# Patient Record
Sex: FEMALE | Race: WHITE | Employment: OTHER | URBAN - METROPOLITAN AREA
[De-identification: names, ages, dates, MRNs, and addresses within clinical notes are randomized per-mention and may not be internally consistent; named-entity substitution may affect disease eponyms.]

---

## 2017-01-23 ENCOUNTER — APPOINTMENT (OUTPATIENT)
Dept: CT IMAGING | Age: 72
End: 2017-01-23
Attending: EMERGENCY MEDICINE
Payer: MEDICARE

## 2017-01-23 ENCOUNTER — APPOINTMENT (OUTPATIENT)
Dept: GENERAL RADIOLOGY | Age: 72
End: 2017-01-23
Attending: EMERGENCY MEDICINE
Payer: MEDICARE

## 2017-01-23 ENCOUNTER — HOSPITAL ENCOUNTER (EMERGENCY)
Age: 72
Discharge: HOME | End: 2017-01-23
Attending: EMERGENCY MEDICINE | Admitting: EMERGENCY MEDICINE
Payer: MEDICARE

## 2017-01-23 VITALS
OXYGEN SATURATION: 96 % | DIASTOLIC BLOOD PRESSURE: 78 MMHG | HEART RATE: 105 BPM | HEIGHT: 65 IN | RESPIRATION RATE: 16 BRPM | WEIGHT: 235 LBS | TEMPERATURE: 98 F | BODY MASS INDEX: 39.15 KG/M2 | SYSTOLIC BLOOD PRESSURE: 155 MMHG

## 2017-01-23 DIAGNOSIS — R07.9 CHEST PAIN, UNSPECIFIED TYPE: Primary | ICD-10-CM

## 2017-01-23 LAB
ALBUMIN SERPL BCP-MCNC: 3.2 G/DL (ref 3.5–5)
ALBUMIN/GLOB SERPL: 0.8 {RATIO} (ref 1.1–2.2)
ALP SERPL-CCNC: 69 U/L (ref 45–117)
ALT SERPL-CCNC: 79 U/L (ref 12–78)
ANION GAP BLD CALC-SCNC: 11 MMOL/L (ref 5–15)
APPEARANCE UR: CLEAR
AST SERPL W P-5'-P-CCNC: 27 U/L (ref 15–37)
ATRIAL RATE: 141 BPM
BACTERIA URNS QL MICRO: NEGATIVE /HPF
BASOPHILS # BLD AUTO: 0 K/UL (ref 0–0.1)
BASOPHILS # BLD: 0 % (ref 0–1)
BILIRUB SERPL-MCNC: 0.4 MG/DL (ref 0.2–1)
BILIRUB UR QL: NEGATIVE
BNP SERPL-MCNC: 254 PG/ML (ref 0–125)
BUN SERPL-MCNC: 16 MG/DL (ref 6–20)
BUN/CREAT SERPL: 19 (ref 12–20)
CALCIUM SERPL-MCNC: 9.5 MG/DL (ref 8.5–10.1)
CALCULATED P AXIS, ECG09: 38 DEGREES
CALCULATED R AXIS, ECG10: 4 DEGREES
CALCULATED T AXIS, ECG11: 54 DEGREES
CHLORIDE SERPL-SCNC: 103 MMOL/L (ref 97–108)
CK MB CFR SERPL CALC: NORMAL % (ref 0–2.5)
CK MB SERPL-MCNC: <1 NG/ML (ref 5–25)
CK SERPL-CCNC: 67 U/L (ref 26–192)
CO2 SERPL-SCNC: 24 MMOL/L (ref 21–32)
COLOR UR: ABNORMAL
CREAT SERPL-MCNC: 0.86 MG/DL (ref 0.55–1.02)
DIAGNOSIS, 93000: NORMAL
DIFFERENTIAL METHOD BLD: ABNORMAL
EOSINOPHIL # BLD: 0 K/UL (ref 0–0.4)
EOSINOPHIL NFR BLD: 0 % (ref 0–7)
EPITH CASTS URNS QL MICRO: ABNORMAL /LPF
ERYTHROCYTE [DISTWIDTH] IN BLOOD BY AUTOMATED COUNT: 16.4 % (ref 11.5–14.5)
GLOBULIN SER CALC-MCNC: 3.9 G/DL (ref 2–4)
GLUCOSE SERPL-MCNC: 143 MG/DL (ref 65–100)
GLUCOSE UR STRIP.AUTO-MCNC: NEGATIVE MG/DL
HCT VFR BLD AUTO: 43 % (ref 35–47)
HGB BLD-MCNC: 14.2 G/DL (ref 11.5–16)
HGB UR QL STRIP: ABNORMAL
HYALINE CASTS URNS QL MICRO: ABNORMAL /LPF (ref 0–5)
INR PPP: 1 (ref 0.9–1.1)
KETONES UR QL STRIP.AUTO: NEGATIVE MG/DL
LEUKOCYTE ESTERASE UR QL STRIP.AUTO: NEGATIVE
LIPASE SERPL-CCNC: 88 U/L (ref 73–393)
LYMPHOCYTES # BLD AUTO: 5 % (ref 12–49)
LYMPHOCYTES # BLD: 0.5 K/UL (ref 0.8–3.5)
MCH RBC QN AUTO: 30.5 PG (ref 26–34)
MCHC RBC AUTO-ENTMCNC: 33 G/DL (ref 30–36.5)
MCV RBC AUTO: 92.5 FL (ref 80–99)
MONOCYTES # BLD: 0.4 K/UL (ref 0–1)
MONOCYTES NFR BLD AUTO: 4 % (ref 5–13)
NEUTS SEG # BLD: 9.5 K/UL (ref 1.8–8)
NEUTS SEG NFR BLD AUTO: 91 % (ref 32–75)
NITRITE UR QL STRIP.AUTO: NEGATIVE
P-R INTERVAL, ECG05: 120 MS
PH UR STRIP: 6.5 [PH] (ref 5–8)
PLATELET # BLD AUTO: 199 K/UL (ref 150–400)
POTASSIUM SERPL-SCNC: 4.2 MMOL/L (ref 3.5–5.1)
PROT SERPL-MCNC: 7.1 G/DL (ref 6.4–8.2)
PROT UR STRIP-MCNC: NEGATIVE MG/DL
PROTHROMBIN TIME: 10 SEC (ref 9–11.1)
Q-T INTERVAL, ECG07: 288 MS
QRS DURATION, ECG06: 70 MS
QTC CALCULATION (BEZET), ECG08: 441 MS
RBC # BLD AUTO: 4.65 M/UL (ref 3.8–5.2)
RBC #/AREA URNS HPF: ABNORMAL /HPF (ref 0–5)
RBC MORPH BLD: ABNORMAL
SODIUM SERPL-SCNC: 138 MMOL/L (ref 136–145)
SP GR UR REFRACTOMETRY: 1.01 (ref 1–1.03)
TROPONIN I SERPL-MCNC: <0.04 NG/ML
UROBILINOGEN UR QL STRIP.AUTO: 0.2 EU/DL (ref 0.2–1)
VENTRICULAR RATE, ECG03: 141 BPM
WBC # BLD AUTO: 10.4 K/UL (ref 3.6–11)
WBC URNS QL MICRO: ABNORMAL /HPF (ref 0–4)

## 2017-01-23 PROCEDURE — 99285 EMERGENCY DEPT VISIT HI MDM: CPT

## 2017-01-23 PROCEDURE — 85025 COMPLETE CBC W/AUTO DIFF WBC: CPT | Performed by: EMERGENCY MEDICINE

## 2017-01-23 PROCEDURE — 74011636320 HC RX REV CODE- 636/320: Performed by: RADIOLOGY

## 2017-01-23 PROCEDURE — 81001 URINALYSIS AUTO W/SCOPE: CPT | Performed by: EMERGENCY MEDICINE

## 2017-01-23 PROCEDURE — 74011250636 HC RX REV CODE- 250/636: Performed by: EMERGENCY MEDICINE

## 2017-01-23 PROCEDURE — 85610 PROTHROMBIN TIME: CPT | Performed by: EMERGENCY MEDICINE

## 2017-01-23 PROCEDURE — 36415 COLL VENOUS BLD VENIPUNCTURE: CPT | Performed by: EMERGENCY MEDICINE

## 2017-01-23 PROCEDURE — 80053 COMPREHEN METABOLIC PANEL: CPT | Performed by: EMERGENCY MEDICINE

## 2017-01-23 PROCEDURE — 82550 ASSAY OF CK (CPK): CPT | Performed by: EMERGENCY MEDICINE

## 2017-01-23 PROCEDURE — 93005 ELECTROCARDIOGRAM TRACING: CPT

## 2017-01-23 PROCEDURE — 96361 HYDRATE IV INFUSION ADD-ON: CPT

## 2017-01-23 PROCEDURE — 71275 CT ANGIOGRAPHY CHEST: CPT

## 2017-01-23 PROCEDURE — 96360 HYDRATION IV INFUSION INIT: CPT

## 2017-01-23 PROCEDURE — 71020 XR CHEST PA LAT: CPT

## 2017-01-23 PROCEDURE — 83880 ASSAY OF NATRIURETIC PEPTIDE: CPT | Performed by: EMERGENCY MEDICINE

## 2017-01-23 PROCEDURE — 84484 ASSAY OF TROPONIN QUANT: CPT | Performed by: EMERGENCY MEDICINE

## 2017-01-23 PROCEDURE — 83690 ASSAY OF LIPASE: CPT | Performed by: EMERGENCY MEDICINE

## 2017-01-23 RX ADMIN — SODIUM CHLORIDE 1000 ML: 900 INJECTION, SOLUTION INTRAVENOUS at 14:56

## 2017-01-23 RX ADMIN — SODIUM CHLORIDE 1000 ML: 900 INJECTION, SOLUTION INTRAVENOUS at 16:55

## 2017-01-23 RX ADMIN — IOPAMIDOL 80 ML: 755 INJECTION, SOLUTION INTRAVENOUS at 16:43

## 2017-01-23 NOTE — ED NOTES
Pt discharged by provider. Pt ambulatory off the unit with steady gait with her spouse and in NAD. Pt declined using a w/c.

## 2017-01-23 NOTE — DISCHARGE INSTRUCTIONS
Chest Pain: Care Instructions  Your Care Instructions  There are many things that can cause chest pain. Some are not serious and will get better on their own in a few days. But some kinds of chest pain need more testing and treatment. Your doctor may have recommended a follow-up visit in the next 8 to 12 hours. If you are not getting better, you may need more tests or treatment. Even though your doctor has released you, you still need to watch for any problems. The doctor carefully checked you, but sometimes problems can develop later. If you have new symptoms or if your symptoms do not get better, get medical care right away. If you have worse or different chest pain or pressure that lasts more than 5 minutes or you passed out (lost consciousness), call 911 or seek other emergency help right away. A medical visit is only one step in your treatment. Even if you feel better, you still need to do what your doctor recommends, such as going to all suggested follow-up appointments and taking medicines exactly as directed. This will help you recover and help prevent future problems. How can you care for yourself at home? · Rest until you feel better. · Take your medicine exactly as prescribed. Call your doctor if you think you are having a problem with your medicine. · Do not drive after taking a prescription pain medicine. When should you call for help? Call 911 if:  · You passed out (lost consciousness). · You have severe difficulty breathing. · You have symptoms of a heart attack. These may include:  ¨ Chest pain or pressure, or a strange feeling in your chest.  ¨ Sweating. ¨ Shortness of breath. ¨ Nausea or vomiting. ¨ Pain, pressure, or a strange feeling in your back, neck, jaw, or upper belly or in one or both shoulders or arms. ¨ Lightheadedness or sudden weakness. ¨ A fast or irregular heartbeat.   After you call 911, the  may tell you to chew 1 adult-strength or 2 to 4 low-dose aspirin. Wait for an ambulance. Do not try to drive yourself. Call your doctor today if:  · You have any trouble breathing. · Your chest pain gets worse. · You are dizzy or lightheaded, or you feel like you may faint. · You are not getting better as expected. · You are having new or different chest pain. Where can you learn more? Go to http://demarco-cathy.info/. Enter A120 in the search box to learn more about \"Chest Pain: Care Instructions. \"  Current as of: May 27, 2016  Content Version: 11.1  © 6341-6100 [employer]. Care instructions adapted under license by [employer] (which disclaims liability or warranty for this information). If you have questions about a medical condition or this instruction, always ask your healthcare professional. [address] any warranty or liability for your use of this information. We hope that we have addressed all of your medical concerns. The examination and treatment you received in the Emergency Department were for an emergent problem and were not intended as complete care. It is important that you follow up with your healthcare provider(s) for ongoing care. If your symptoms worsen or do not improve as expected, and you are unable to reach your usual health care provider(s), you should return to the Emergency Department. Today's healthcare is undergoing tremendous change, and patient satisfaction surveys are one of the many tools to assess the quality of medical care. You may receive a survey from the [employer] organization regarding your experience in the Emergency Department. I hope that your experience has been completely positive, particularly the medical care that I provided. As such, please participate in the survey; anything less than excellent does not meet my expectations or intentions.         [facility] and [facility] participate in nationally recognized quality of care measures. If your blood pressure is greater than 120/80, as reported below, we urge that you seek medical care to address the potential of high blood pressure, commonly known as hypertension. Hypertension can be hereditary or can be caused by certain medical conditions, pain, stress, or \"white coat syndrome. \"       Please make an appointment with your health care provider(s) for follow up of your Emergency Department visit. VITALS:   Patient Vitals for the past 8 hrs:   Temp Pulse Resp BP SpO2   01/23/17 1615 - (!) 112 16 149/66 95 %   01/23/17 1600 - (!) 117 22 143/69 92 %   01/23/17 1545 - (!) 119 21 144/70 93 %   01/23/17 1515 - (!) 130 20 126/67 94 %   01/23/17 1500 - (!) 137 28 158/62 93 %   01/23/17 1442 98 °F (36.7 °C) (!) 135 17 (!) 150/91 94 %          Thank you for allowing us to provide you with medical care today. We realize that you have many choices for your emergency care needs. Please choose us in the future for any continued health care needs. [address], [address] [facility].   Office: [phone]            Recent Results (from the past 24 hour(s))   EKG, 12 LEAD, INITIAL    Collection Time: 01/23/17  2:40 PM   Result Value Ref Range    Ventricular Rate 141 BPM    Atrial Rate 141 BPM    P-R Interval 120 ms    QRS Duration 70 ms    Q-T Interval 288 ms    QTC Calculation (Bezet) 441 ms    Calculated P Axis 38 degrees    Calculated R Axis 4 degrees    Calculated T Axis 54 degrees    Diagnosis       Sinus tachycardia  Otherwise normal ECG  When compared with ECG of 04-OCT-2016 15:12,  No significant change was found  Confirmed by Richi Traore ([id number]) on 1/23/2017 5:31:41 PM     PROTHROMBIN TIME + INR    Collection Time: 01/23/17  2:54 PM   Result Value Ref Range    INR 1.0 0.9 - 1.1      Prothrombin time 10.0 9.0 - 11.1 sec   TROPONIN I    Collection Time: 01/23/17  2:54 PM   Result Value Ref Range Troponin-I, Qt. <0.04 <0.05 ng/mL   LIPASE    Collection Time: 01/23/17  2:54 PM   Result Value Ref Range    Lipase 88 73 - 745 U/L   METABOLIC PANEL, COMPREHENSIVE    Collection Time: 01/23/17  2:54 PM   Result Value Ref Range    Sodium 138 136 - 145 mmol/L    Potassium 4.2 3.5 - 5.1 mmol/L    Chloride 103 97 - 108 mmol/L    CO2 24 21 - 32 mmol/L    Anion gap 11 5 - 15 mmol/L    Glucose 143 (H) 65 - 100 mg/dL    BUN 16 6 - 20 MG/DL    Creatinine 0.86 0.55 - 1.02 MG/DL    BUN/Creatinine ratio 19 12 - 20      GFR est AA >60 >60 ml/min/1.73m2    GFR est non-AA >60 >60 ml/min/1.73m2    Calcium 9.5 8.5 - 10.1 MG/DL    Bilirubin, total 0.4 0.2 - 1.0 MG/DL    ALT 79 (H) 12 - 78 U/L    AST 27 15 - 37 U/L    Alk. phosphatase 69 45 - 117 U/L    Protein, total 7.1 6.4 - 8.2 g/dL    Albumin 3.2 (L) 3.5 - 5.0 g/dL    Globulin 3.9 2.0 - 4.0 g/dL    A-G Ratio 0.8 (L) 1.1 - 2.2     CK W/ CKMB & INDEX    Collection Time: 01/23/17  2:54 PM   Result Value Ref Range    CK 67 26 - 192 U/L    CK - MB <1.0 <3.6 NG/ML    CK-MB Index CANNOT BE CALCULATED 0 - 2.5     CBC WITH AUTOMATED DIFF    Collection Time: 01/23/17  2:54 PM   Result Value Ref Range    WBC 10.4 3.6 - 11.0 K/uL    RBC 4.65 3.80 - 5.20 M/uL    HGB 14.2 11.5 - 16.0 g/dL    HCT 43.0 35.0 - 47.0 %    MCV 92.5 80.0 - 99.0 FL    MCH 30.5 26.0 - 34.0 PG    MCHC 33.0 30.0 - 36.5 g/dL    RDW 16.4 (H) 11.5 - 14.5 %    PLATELET 203 257 - 224 K/uL    NEUTROPHILS 91 (H) 32 - 75 %    LYMPHOCYTES 5 (L) 12 - 49 %    MONOCYTES 4 (L) 5 - 13 %    EOSINOPHILS 0 0 - 7 %    BASOPHILS 0 0 - 1 %    ABS. NEUTROPHILS 9.5 (H) 1.8 - 8.0 K/UL    ABS. LYMPHOCYTES 0.5 (L) 0.8 - 3.5 K/UL    ABS. MONOCYTES 0.4 0.0 - 1.0 K/UL    ABS. EOSINOPHILS 0.0 0.0 - 0.4 K/UL    ABS.  BASOPHILS 0.0 0.0 - 0.1 K/UL    DF SMEAR SCANNED      RBC COMMENTS ANISOCYTOSIS  1+       PRO-BNP    Collection Time: 01/23/17  2:54 PM   Result Value Ref Range    NT pro- (H) 0 - 125 PG/ML   URINALYSIS W/MICROSCOPIC Collection Time: 01/23/17  4:25 PM   Result Value Ref Range    Color YELLOW/STRAW      Appearance CLEAR CLEAR      Specific gravity 1.007 1.003 - 1.030      pH (UA) 6.5 5.0 - 8.0      Protein NEGATIVE  NEG mg/dL    Glucose NEGATIVE  NEG mg/dL    Ketone NEGATIVE  NEG mg/dL    Bilirubin NEGATIVE  NEG      Blood TRACE (A) NEG      Urobilinogen 0.2 0.2 - 1.0 EU/dL    Nitrites NEGATIVE  NEG      Leukocyte Esterase NEGATIVE  NEG      WBC 0-4 0 - 4 /hpf    RBC 0-5 0 - 5 /hpf    Epithelial cells FEW FEW /lpf    Bacteria NEGATIVE  NEG /hpf    Hyaline Cast 0-2 0 - 5 /lpf       Xr Chest Pa Lat    Result Date: 1/23/2017  Exam:  2 view chest Indication: Chest pain, tachycardia today Comparison to 10/4/2016. PA and lateral views demonstrate normal heart size. There is no acute process in the lung fields. Scarring is stable at the lung bases. Old bilateral rib fractures are noted. Impression: No acute process or change compared to the prior exam.     Cta Chest W Wo Cont    Result Date: 1/23/2017  EXAM: CT of the chest, abdomen and pelvis. Clinical history: Metastatic breast cancer, chest pain INDICATION: Follow-up pneumonia and restaging of metastatic breast cancer. Chest pain acute, pulmonary origin. TECHNIQUE: CT of the chest, with 5 mm collimation. The study is performed with p.o. and IV contrast. Sagittal and coronal reformats were performed and evaluated. CT dose reduction was achieved through use of a standardized protocol tailored for this examination and automatic exposure control for dose modulation. Adaptive statistical iterative reconstruction (ASIR) was utilized. 3-D MIP reconstructed imaging was performed CONTRAST:  100 cc Isovue-370 contrast used IV. COMPARISON: 11/3/2016 FINDINGS: The lungs show interval clearing of bibasilar airspace infiltrate with some residual linear atelectasis and/or scarring medially. The lungs are otherwise clear with no acute infiltrate or nodular density otherwise.  There is no enlarged axillary, mediastinal or hilar lymphadenopathy. Pleural spaces are normal. Heart is of normal size and there is no pericardial effusion. The surrounding musculoskeletal structures redemonstrate post surgical changes of left breast surgery and left axillary node dissection as well as expansile mixed lytic and blastic lesions within the left posterior lateral seventh eighth and ninth ribs on the right posterior seventh rib, multiple vertebral lesions, not significantly changed . Healing right posterior rib fracture. Cholelithiasis. Chronic vertebral lesions at T6-T7 and T11. IMPRESSION: No pulmonary embolism. No aortic aneurysm or dissection. 1. Healing right-sided rib fracture. 2. Numerous osseous metastases with no new sites of metastasis identified within the chest, abdomen, or pelvis, shown showing increased sclerosis from prior which may reflect changes in response to therapy. 3. Additional incidental findings as above including cholelithiasis and sliding hiatal hernia.

## 2017-01-23 NOTE — ED PROVIDER NOTES
HPI Comments: 80 y.o. female with past medical history significant for breast cancer with metastases to bone who presents to the ED with chief complaint of chest pain. Pt reports \"crampy\" mid-sternal chest pain onset this morning at approximately 1130 accompanied by diaphoresis and lightheadedness. Pt states the episode lasted for about 30 minutes and her sx are now resolved. Pt states she has exertional SOB at baseline but is not currently short of breath. Pt states she has hx of similar sx, most recently last week. Pt states she is currently being treated for breast cancer with metastases to her bones by Dr. Ines Ortiz and took a new oral chemotherapy medication this morning about 2 hours prior to the onset of her chest pain, says she is supposed to take another dose of the medication tonight. Pt states she is also currently taking prednisone which has been causing her to have issues with acid reflux. Pt states she last ate eggs and a sausage medina at about 0930 this morning. Pt denies taking any medications for her sx. Pt states she has hx of gall bladder issues. Pt denies diarrhea, fever, chills, abdominal pain, vaginal discharge, or dysuria. There are no other acute medical complaints voiced at this time. Social Hx: . PCP: Joyce Ponce MD  Oncology: Dr. Ines Ortiz    Note written by Lakisha Cortez, as dictated by Yanna Matson MD 2:50 PM     The history is provided by the patient.         Past Medical History:   Diagnosis Date    Cancer [facility]) 2011     LEFT breast cancer    Ill-defined condition      Breast CA mets bone 2/2016       Past Surgical History:   Procedure Laterality Date    Hx appendectomy      Hx breast lumpectomy       LEFT breast    Hx other surgical  3/2011     portacath insertion    Colonoscopy N/A 9/15/2016     COLONOSCOPY performed by Mikaela Rocha MD at [facility]         Family History:   Problem Relation Age of Onset    Cancer Mother      colon    Heart Disease Father     Cancer Father     Diabetes Sister        Social History     Social History    Marital status:      Spouse name: N/A    Number of children: N/A    Years of education: N/A     Occupational History    Not on file. Social History Main Topics    Smoking status: Never Smoker    Smokeless tobacco: Never Used    Alcohol use No    Drug use: No    Sexual activity: Yes     Partners: Male     Other Topics Concern    Not on file     Social History Narrative         ALLERGIES: Afinitor [everolimus]    Review of Systems   Constitutional: Positive for diaphoresis. Negative for chills and fever. Cardiovascular: Positive for chest pain. Gastrointestinal: Negative for abdominal pain and diarrhea. Genitourinary: Negative for dysuria and vaginal discharge. Neurological: Positive for light-headedness. All other systems reviewed and are negative. Vitals:    01/23/17 1600 01/23/17 1615 01/23/17 1734 01/23/17 1812   BP: 143/69 149/66 139/54 155/78   Pulse: (!) 117 (!) 112 (!) 106 (!) 105   Resp: 22 16 12 16   Temp:       SpO2: 92% 95%  96%   Weight:       Height:                Physical Exam   Constitutional: She is oriented to person, place, and time. She appears well-developed and well-nourished. No distress. NAD, AxOx4, speaking in complete sentences     HENT:   Nose: Nose normal.   Mouth/Throat: No oropharyngeal exudate. Cn intact   Eyes: Conjunctivae and EOM are normal. Pupils are equal, round, and reactive to light. Right eye exhibits no discharge. Left eye exhibits no discharge. No scleral icterus. Neck: Normal range of motion. Neck supple. No JVD present. No tracheal deviation present. Cardiovascular: Normal rate, regular rhythm, normal heart sounds and intact distal pulses. Exam reveals no gallop and no friction rub. No murmur heard. Pulmonary/Chest: Effort normal and breath sounds normal. No respiratory distress. She has no wheezes. She has no rales.  She exhibits no tenderness. Abdominal: Soft. Bowel sounds are normal. There is no tenderness. There is no rebound and no guarding. nttp   Genitourinary: No vaginal discharge found. Musculoskeletal: Normal range of motion. She exhibits no edema or tenderness. Neurological: She is alert and oriented to person, place, and time. No cranial nerve deficit. She exhibits normal muscle tone. Coordination normal.   Skin: Skin is warm and dry. No rash noted. No erythema. No pallor. Psychiatric: She has a normal mood and affect. Her behavior is normal. Thought content normal.   Nursing note and vitals reviewed. MDM  ED Course       Procedures    Chief Complaint   Patient presents with    Chest Pain       2:40 PM  The patients presenting problems have been discussed, and they are in agreement with the care plan formulated and outlined with them. I have encouraged them to ask questions as they arise throughout their visit. MEDICATIONS GIVEN:  Medications - No data to display    LABS REVIEWED:  Labs Reviewed - No data to display    RADIOLOGY RESULTS:  The following have been ordered and reviewed:  _____________________________________________________________________  _____________________________________________________________________    EKG interpretation: (Preliminary)  Rhythm: sinus tachycardia rhythm; and regular . Rate (approx.): 140; Axis: normal; P wave: normal; QRS interval: normal ; ST/T wave: normal; Negative acute significant segmental elevations    PROCEDURES:        CONSULTATIONS:       PROGRESS NOTES:      DIAGNOSIS:    1. Chest pain, unspecified type        PLAN:  1- Chest Pain / neg ed evaluation - will have pt follow-up with Cardiology;       ED COURSE: The patients hospital course has been uncomplicated. 4:27 PM  Pt told of results thus far; agrees w/ CT-PE;     5:35 PM  Gayle Arita's  results have been reviewed with her. She has been counseled regarding her diagnosis.   She verbally conveys understanding and agreement of the signs, symptoms, diagnosis, treatment and prognosis and additionally agrees to Call/ Arrange follow up as recommended with Dr. Tana Ag MD in 24 - 48 hours. She also agrees with the care-plan and conveys that all of her questions have been answered. I have also put together some discharge instructions for her that include: 1) educational information regarding their diagnosis, 2) how to care for their diagnosis at home, as well a 3) list of reasons why they would want to return to the ED prior to their follow-up appointment, should their condition change or for concerns.

## 2017-01-23 NOTE — ED TRIAGE NOTES
Pt. Reports an episode of chest pain and diaphoresis that began at 1000 and lasted approx. 20 minutes. Denies any since. States she began a new cancer dug this morning and is worried it may be that.

## 2017-01-25 ENCOUNTER — OFFICE VISIT (OUTPATIENT)
Dept: INTERNAL MEDICINE CLINIC | Age: 72
End: 2017-01-25

## 2017-01-25 VITALS
OXYGEN SATURATION: 98 % | SYSTOLIC BLOOD PRESSURE: 123 MMHG | TEMPERATURE: 98.2 F | HEIGHT: 65 IN | BODY MASS INDEX: 39.52 KG/M2 | WEIGHT: 237.2 LBS | RESPIRATION RATE: 16 BRPM | DIASTOLIC BLOOD PRESSURE: 74 MMHG | HEART RATE: 137 BPM

## 2017-01-25 DIAGNOSIS — K21.9 GASTROESOPHAGEAL REFLUX DISEASE WITHOUT ESOPHAGITIS: ICD-10-CM

## 2017-01-25 DIAGNOSIS — R07.89 OTHER CHEST PAIN: Primary | ICD-10-CM

## 2017-01-25 DIAGNOSIS — C50.919 BREAST CANCER, STAGE 3, UNSPECIFIED LATERALITY (HCC): ICD-10-CM

## 2017-01-25 DIAGNOSIS — R06.02 SOB (SHORTNESS OF BREATH): ICD-10-CM

## 2017-01-25 RX ORDER — FLUTICASONE PROPIONATE AND SALMETEROL XINAFOATE 115; 21 UG/1; UG/1
AEROSOL, METERED RESPIRATORY (INHALATION)
COMMUNITY
Start: 2017-01-17 | End: 2017-02-28

## 2017-01-25 RX ORDER — OMEPRAZOLE 40 MG/1
CAPSULE, DELAYED RELEASE ORAL DAILY
COMMUNITY
Start: 2016-12-13 | End: 2019-01-11 | Stop reason: ALTCHOICE

## 2017-01-25 RX ORDER — CAPECITABINE 500 MG/1
TABLET, FILM COATED ORAL
Refills: 6 | COMMUNITY
Start: 2017-01-11 | End: 2017-12-18 | Stop reason: ALTCHOICE

## 2017-01-31 ENCOUNTER — OFFICE VISIT (OUTPATIENT)
Dept: CARDIOLOGY CLINIC | Age: 72
End: 2017-01-31

## 2017-01-31 VITALS
BODY MASS INDEX: 40.15 KG/M2 | RESPIRATION RATE: 22 BRPM | SYSTOLIC BLOOD PRESSURE: 136 MMHG | HEART RATE: 102 BPM | OXYGEN SATURATION: 96 % | DIASTOLIC BLOOD PRESSURE: 84 MMHG | HEIGHT: 65 IN | WEIGHT: 241 LBS

## 2017-01-31 DIAGNOSIS — R00.0 SINUS TACHYCARDIA: ICD-10-CM

## 2017-01-31 DIAGNOSIS — C50.919 BREAST CANCER, STAGE 3, UNSPECIFIED LATERALITY (HCC): ICD-10-CM

## 2017-01-31 DIAGNOSIS — R06.00 DYSPNEA, UNSPECIFIED TYPE: Primary | ICD-10-CM

## 2017-01-31 NOTE — PROGRESS NOTES
Magdaleno Arceo MD    [id number] [place], [address] [facility]    Office [phone],BL [phone]  Pager [phone]    Selena Jones is a 80 y.o. female referred for evaluation of dyspnea. Consult requested by Saud Simms MD      Primary care physician:  Saud Simms MD      Dear Dr Zak Jo,    I had the pleasure of seeing Ms Selena Jones in the office today. Chief complaint:  Selena Jones is a 80 y.o. female who complains of   Chief Complaint   Patient presents with    Irregular Heart Beat     F/u from Er  c/o SOB  ongoing    Chest Pain       Assessment:  Dyspnea  Chest Pain-atypical.  Left breast cancer metastasized to bone-recent chemotherapy caused pneumonitis for which he is on a weaning dose of steroids. (History of radiation treatment to the chest)  GERD-improved      Plan:   Echocardiogram.  She will follow-up with me in 4 weeks and if she continues to have symptoms of atypical chest pain, we will consider doing a stress nuclear study. She states that her lipids have been within normal range. Aggressive cardiovascular risk factor modification. Patient understands the plan. All questions were answered to the patient's satisfaction. Medication Side Effects and Warnings were discussed with patient: yes  Patient Labs were reviewed and or requested:  yes  Patient Past Records were reviewed and or requested: yes    I appreciate the opportunity to be involved in Ms. Love. Please see note below for details. Please do not hesitate to contact us with questions or concerns. Magdaleno Arceo MD    Cardiac Testing/ Procedures: A. Cardiac Cath/PCI:    B.ECHO/ZAID: 3/30/11 - Left ventricle: Systolic function was normal. Ejection fraction was  estimated in the range of 55 % to 60 %. There were no regional wall motion  abnormalities.     C.StressNuclear/Stress ECHO/Stress test:    D.Vascular:    E. EP:    F. Miscellaneous:    History of present illness:     Patient is a pleasant 52-year-old  female who has a history of breast cancer status post left lumpectomy with radiation treatment to the chest twice. She has been on chemotherapy. She states that her breast cancer has metastasized to the bone-spine/hips/sternum/ribs. She was on a medication for her cancer recently which caused her to have pneumonitis. She is off that medication. She has seen  (pulmonologist) who has treated the pneumonitis with steroids. She developed GERD because of the steroids and is now on a PPI. On 1/23/17 she came to the emergency room because she had an episode of diaphoresis, epigastric pain, dizziness when she stood up. CT scan was negative for PE. Troponin was negative. EKG showed sinus tachycardia. Patient states that her GERD symptoms have resolved and she does not have any chest pain. Denies any swelling of the lower extremities. No prior cardiac history. No chest pain.       Past Medical History   Diagnosis Date    Cancer [facility]) 2011     LEFT breast cancer    Ill-defined condition      Breast CA mets bone 2/2016      Past Surgical History   Procedure Laterality Date    Hx appendectomy      Hx breast lumpectomy       LEFT breast    Hx other surgical  3/2011     portacath insertion    Colonoscopy N/A 9/15/2016     COLONOSCOPY performed by Willow Olsen MD at [facility]      Family History   Problem Relation Age of Onset    Cancer Mother      colon    Heart Disease Father     Cancer Father     Diabetes Sister       Social History   Substance Use Topics    Smoking status: Never Smoker    Smokeless tobacco: Never Used    Alcohol use No          Medications before admission:    Current Outpatient Prescriptions   Medication Sig Dispense    capecitabine (XELODA) 500 mg tablet TK 4 TABLETS PO BID FOR 14 DAYS     ADVAIR -21 mcg/actuation inhaler      omeprazole (PRILOSEC) 40 mg capsule      predniSONE (DELTASONE) 20 mg tablet Take 2 Tabs by mouth daily (with breakfast). 60 Tab    denosumab (XGEVA) soln injection 120 mg by SubCUTAneous route every thirty (30) days.  pyridoxine (VITAMIN B-6) 100 mg tablet Take 100 mg by mouth daily.  calcium-vitamin D (OYSTER SHELL) 500 mg(1,250mg) -200 unit per tablet Take 1 Tab by mouth daily.  exemestane (AROMASIN) 25 mg tablet Take 25 mg by mouth daily. No current facility-administered medications for this visit. Review of Systems:  (bold if positive, if negative)  As in HPI-rest of ROS noncontributory        Physical Exam:  Visit Vitals    /84 (BP 1 Location: Left arm, BP Patient Position: Sitting)    Pulse (!) 102    Resp 22    Ht 5' 5\" (1.651 m)    Wt 241 lb (109.3 kg)    LMP 01/28/2000    SpO2 96%    BMI 40.1 kg/m2          Gen: Well-developed, well-nourished, in no acute distress,   HEENT:  Pink conjunctivae, hearing intact to voice, moist mucous membranes  Neck: Supple,No JVD, No Carotid Bruit, Thyroid- non tender  Resp: No accessory muscle use, Clear breath sounds, No rales or rhonchi  Card: Regular Rate,Rythm,Normal S1, S2, No murmurs, rubs or gallop. No thrills.    Abd:  Soft, non-tender, non-distended, normoactive bowel sounds are present,   MSK: No cyanosis or clubbing  Skin: No rashes or ulcers  Neuro:  moving all four extremities, no focal deficit, follows commands appropriately  Psych:  Good insight, oriented to person, place and time, alert, Nml Affect  LE: No edema  Vascular: Radial Pulses 2+ and symmetric        EKG:   Results for orders placed or performed during the hospital encounter of 01/23/17   EKG, 12 LEAD, INITIAL   Result Value Ref Range    Ventricular Rate 141 BPM    Atrial Rate 141 BPM    P-R Interval 120 ms    QRS Duration 70 ms    Q-T Interval 288 ms    QTC Calculation (Bezet) 441 ms    Calculated P Axis 38 degrees    Calculated R Axis 4 degrees    Calculated T Axis 54 degrees    Diagnosis       Sinus tachycardia  Otherwise normal ECG  When compared with ECG of 04-OCT-2016 15:12,  No significant change was found  Confirmed by Tai Shah ([id number]) on 1/23/2017 5:31:41 PM         Cxray:    LABS:    No results for input(s): CPK, TROIQ in the last 72 hours.     No lab exists for component: CKQMB, CPKMB    Lab Results   Component Value Date/Time    WBC 10.4 01/23/2017 02:54 PM    HGB 14.2 01/23/2017 02:54 PM    HCT 43.0 01/23/2017 02:54 PM    PLATELET 426 80/81/2589 02:54 PM    MCV 92.5 01/23/2017 02:54 PM     Lab Results   Component Value Date/Time    Sodium 138 01/23/2017 02:54 PM    Potassium 4.2 01/23/2017 02:54 PM    Chloride 103 01/23/2017 02:54 PM    CO2 24 01/23/2017 02:54 PM    Anion gap 11 01/23/2017 02:54 PM    Glucose 143 01/23/2017 02:54 PM    BUN 16 01/23/2017 02:54 PM    Creatinine 0.86 01/23/2017 02:54 PM    BUN/Creatinine ratio 19 01/23/2017 02:54 PM    GFR est AA >60 01/23/2017 02:54 PM    GFR est non-AA >60 01/23/2017 02:54 PM    Calcium 9.5 01/23/2017 02:54 PM             Antoinette Keith MD

## 2017-01-31 NOTE — MR AVS SNAPSHOT
Visit Information Date & Time Provider Department Dept. Phone Encounter #  
 1/31/2017  9:00 AM Nick Liu MD [facility] Tamia Morgan [phone] [id number] Upcoming Health Maintenance Date Due DTaP/Tdap/Td series (1 - Tdap) 12/16/1967 ZOSTER VACCINE AGE 60> 12/16/2006 GLAUCOMA SCREENING Q2Y 5/1/2016 MEDICARE YEARLY EXAM 10/6/2016 BREAST CANCER SCRN MAMMOGRAM 2/1/2018 COLONOSCOPY 9/15/2026 Allergies as of 1/31/2017  Review Complete On: 1/31/2017 By: Sharon Akins LPN Severity Noted Reaction Type Reactions Afinitor [Everolimus]  01/23/2017    Other (comments) \"inflammation in my lungs\" Current Immunizations  Reviewed on 10/6/2015 Name Date Influenza High Dose Vaccine PF 10/24/2016 Influenza Vaccine 11/24/2014 Influenza Vaccine Split 10/13/2011 Pneumococcal Conjugate (PCV-13) 10/6/2015 Pneumococcal Vaccine (Unspecified Type) 10/1/2013 Not reviewed this visit You Were Diagnosed With   
  
 Codes Comments Dyspnea, unspecified type    -  Primary ICD-10-CM: R06.00 
ICD-9-CM: 786.09 Breast cancer, stage 3, unspecified laterality ([email or web address]ca [facility].)     ICD-10-CM: C50.919 ICD-9-CM: 174.9 Sinus tachycardia     ICD-10-CM: R00.0 ICD-9-CM: 427.89 Vitals BP Pulse Resp Height(growth percentile) Weight(growth percentile) LMP  
 136/84 (BP 1 Location: Left arm, BP Patient Position: Sitting) (!) 102 22 5' 5\" (1.651 m) 241 lb (109.3 kg) 01/28/2000 SpO2 BMI OB Status Smoking Status 96% 40.1 kg/m2 Postmenopausal Never Smoker Vitals History BMI and BSA Data Body Mass Index Body Surface Area  
 40.1 kg/m 2 2.24 m 2 Preferred Pharmacy Pharmacy Name Phone [id number] [place] [address] [phone] Your Updated Medication List  
  
   
This list is accurate as of: 1/31/17  9:43 AM.  Always use your most recent med list.  
  
  
  
  
 ADVAIR -21 mcg/actuation inhaler Generic drug:  fluticasone-salmeterol  
  
 calcium-vitamin D 500 mg(1,250mg) -200 unit per tablet Commonly known as:  OYSTER SHELL Take 1 Tab by mouth daily. capecitabine 500 mg tablet Commonly known as:  XELODA TK 4 TABLETS PO BID FOR 14 DAYS  
  
 denosumab Soln injection Commonly known as:  XGEVA  
120 mg by SubCUTAneous route every thirty (30) days. exemestane 25 mg tablet Commonly known as:  Francisco Javier Cake Take 25 mg by mouth daily. omeprazole 40 mg capsule Commonly known as:  PRILOSEC  
  
 predniSONE 20 mg tablet Commonly known as:  Yany Aver Take 2 Tabs by mouth daily (with breakfast). VITAMIN B-6 100 mg tablet Generic drug:  pyridoxine (vitamin B6) Take 100 mg by mouth daily. Introducing [facility] & HEALTH SERVICES! Dear Romi Palm: Thank you for requesting a Quitbit account. Our records indicate that you already have an active Quitbit account. You can access your account anytime at [email or web address] Did you know that you can access your hospital and ER discharge instructions at any time in Quitbit? You can also review all of your test results from your hospital stay or ER visit. Additional Information If you have questions, please visit the Frequently Asked Questions section of the Quitbit website at [email or web address] Remember, Quitbit is NOT to be used for urgent needs. For medical emergencies, dial 911. Now available from your iPhone and Android! Please provide this summary of care documentation to your next provider. Your primary care clinician is listed as Phill Jones. If you have any questions after today's visit, please call [phone].

## 2017-02-06 ENCOUNTER — CLINICAL SUPPORT (OUTPATIENT)
Dept: CARDIOLOGY CLINIC | Age: 72
End: 2017-02-06

## 2017-02-06 DIAGNOSIS — R06.00 DYSPNEA, UNSPECIFIED TYPE: Primary | ICD-10-CM

## 2017-02-28 ENCOUNTER — OFFICE VISIT (OUTPATIENT)
Dept: CARDIOLOGY CLINIC | Age: 72
End: 2017-02-28

## 2017-02-28 VITALS
SYSTOLIC BLOOD PRESSURE: 132 MMHG | WEIGHT: 243 LBS | OXYGEN SATURATION: 97 % | DIASTOLIC BLOOD PRESSURE: 78 MMHG | BODY MASS INDEX: 40.48 KG/M2 | RESPIRATION RATE: 18 BRPM | HEIGHT: 65 IN | HEART RATE: 88 BPM

## 2017-02-28 DIAGNOSIS — R60.9 EDEMA, UNSPECIFIED TYPE: ICD-10-CM

## 2017-02-28 DIAGNOSIS — R06.09 DYSPNEA ON EXERTION: Primary | ICD-10-CM

## 2017-02-28 DIAGNOSIS — C50.919 BREAST CANCER, STAGE 3, UNSPECIFIED LATERALITY (HCC): ICD-10-CM

## 2017-02-28 RX ORDER — PREDNISONE 20 MG/1
5 TABLET ORAL
Status: ON HOLD | COMMUNITY
End: 2017-03-16 | Stop reason: CLARIF

## 2017-02-28 NOTE — PROGRESS NOTES
Thai Devlin MD    [id number] [facility] [place], [address] [facility]    Office [phone],ABHINAV [phone]  Pager [phone]    Zeinab Bhatti is a 80 y.o. female is here for f/u visit. Primary care physician:  Skip De Los Santos MD    Patient Active Problem List   Diagnosis Code    Breast cancer, stage 3 ([facility] [address] [facility] C50.919    Pneumonia J18.9    SIRS (systemic inflammatory response syndrome) (HCC) R65.10    Transaminitis R74.0    Lactic acidosis E87.2       Dear Dr. Elías Farr,    I had the pleasure of seeing  Zeinab Bhatti in the office today. Chief complaint:  Chief Complaint   Patient presents with    Follow-up    Chest Pain       Assessment:  Dyspnea  Left breast cancer metastasized to bone-recent chemotherapy caused pneumonitis for which he is on a weaning dose of steroids. (History of radiation treatment to the chest)  GERD-improved  Edema-patient is on a new chemotherapy drug and her swelling in the leg started after this medication. Plan:   Sammie nuclear study. She states that her lipids have been within normal range. She is followed by pulmonology. She had pneumonitis from her previous chemotherapy medication. She has also been told that she has asthma and has an inhaler but she states that inhaler is very expensive and she does not use it all the time  Aggressive cardiovascular risk factor modification. Follow-up in 3 months or earlier if stress study is abnormal.    Patient understands the plan. All questions were answered to the patient's satisfaction. Medication Side Effects and Warnings were discussed with patient: yes  Patient Labs were reviewed and or requested:  yes  Patient Past Records were reviewed and or requested: yes    I appreciate the opportunity to be involved in Ms. Love. See note below for details. Please do not hesitate to contact us with questions or concerns. Thai Devlin MD    Cardiac Testing/ Procedures: A. Cardiac Cath/PCI:    B.ECHO/ZAID: 2/6/11 Left ventricle: Systolic function was normal. Ejection fraction was  estimated in the range of 55 % to 60 %. There were no regional wall motion  abnormalities. 3/30/11 - Left ventricle: Systolic function was normal. Ejection fraction was  estimated in the range of 55 % to 60 %. There were no regional wall motion  abnormalities. C.StressNuclear/Stress ECHO/Stress test:    D.Vascular:    E. EP:    F. Miscellaneous:    Subjective:  Kevin Zamora is a 80 y.o. female who returns for follow up visit. Patient continues to have dyspnea with exertion. No chest pain. No dizziness. (1/31/17  Initial visit- Patient is a pleasant 26-year-old  female who has a history of breast cancer status post left lumpectomy with radiation treatment to the chest twice. She has been on chemotherapy. She states that her breast cancer has metastasized to the bone-spine/hips/sternum/ribs. She was on a medication for her cancer recently which caused her to have pneumonitis. She is off that medication. She has seen  (pulmonologist) who has treated the pneumonitis with steroids. She developed GERD because of the steroids and is now on a PPI. On 1/23/17 she came to the emergency room because she had an episode of diaphoresis, epigastric pain, dizziness when she stood up. CT scan was negative for PE. Troponin was negative. EKG showed sinus tachycardia.)      ROS:  (bold if positive, if negative)             Medications before admission:    Current Outpatient Prescriptions   Medication Sig Dispense    predniSONE (DELTASONE) 20 mg tablet Take 5 mg by mouth daily (with breakfast).  capecitabine (XELODA) 500 mg tablet TK 4 TABLETS PO BID FOR 14 DAYS     omeprazole (PRILOSEC) 40 mg capsule Take  by mouth daily.  denosumab (XGEVA) soln injection 120 mg by SubCUTAneous route every thirty (30) days.  pyridoxine (VITAMIN B-6) 100 mg tablet Take 100 mg by mouth daily.      calcium-vitamin D (OYSTER SHELL) 500 mg(1,250mg) -200 unit per tablet Take 1 Tab by mouth daily. No current facility-administered medications for this visit. Family History of CAD:    Yes    Social History:  Current  Smoker  No    Physical Exam:         Gen: Well-developed, well-nourished, in no acute distress  Neck: Supple,No JVD, No Carotid Bruit,   Resp: No accessory muscle use, Clear breath sounds, No rales or rhonchi  Card: Regular Rate,Rythm,Normal S1, S2, No murmurs, rubs or gallop. No thrills.    Abd:  Soft, non-tender, non-distended,BS+,   MSK: No cyanosis  Skin: No rashes    Neuro: moving all four extremities , follows commands appropriately  Psych:  Good insight, oriented to person, place , alert, Nml Affect  LE: No edema    EKG:       LABS:        Lab Results   Component Value Date/Time    WBC 10.4 01/23/2017 02:54 PM    HGB 14.2 01/23/2017 02:54 PM    HCT 43.0 01/23/2017 02:54 PM    PLATELET 315 30/04/9332 02:54 PM     Lab Results   Component Value Date/Time    Sodium 138 01/23/2017 02:54 PM    Potassium 4.2 01/23/2017 02:54 PM    Chloride 103 01/23/2017 02:54 PM    CO2 24 01/23/2017 02:54 PM    Anion gap 11 01/23/2017 02:54 PM    Glucose 143 01/23/2017 02:54 PM    BUN 16 01/23/2017 02:54 PM    Creatinine 0.86 01/23/2017 02:54 PM    BUN/Creatinine ratio 19 01/23/2017 02:54 PM    GFR est AA >60 01/23/2017 02:54 PM    GFR est non-AA >60 01/23/2017 02:54 PM    Calcium 9.5 01/23/2017 02:54 PM       Lab Results   Component Value Date/Time    aPTT 30.6 10/04/2016 03:21 PM     Lab Results   Component Value Date/Time    INR 1.0 01/23/2017 02:54 PM    INR 1.1 10/04/2016 03:21 PM    INR 0.9 02/11/2016 09:00 AM    Prothrombin time 10.0 01/23/2017 02:54 PM    Prothrombin time 10.5 10/04/2016 03:21 PM    Prothrombin time 9.5 02/11/2016 09:00 AM     No components found for: Liv Vee MD

## 2017-02-28 NOTE — MR AVS SNAPSHOT
Visit Information Date & Time Provider Department Dept. Phone Encounter #  
 2/28/2017 10:20 AM Palma Fowler MD [facility] Miriam Downey [phone] [id number] Upcoming Health Maintenance Date Due DTaP/Tdap/Td series (1 - Tdap) 12/16/1967 ZOSTER VACCINE AGE 60> 12/16/2006 GLAUCOMA SCREENING Q2Y 5/1/2016 MEDICARE YEARLY EXAM 10/6/2016 BREAST CANCER SCRN MAMMOGRAM 2/1/2018 COLONOSCOPY 9/15/2026 Allergies as of 2/28/2017  Review Complete On: 2/28/2017 By: Elmer Gomez Severity Noted Reaction Type Reactions Afinitor [Everolimus]  01/23/2017    Other (comments) \"inflammation in my lungs\" Current Immunizations  Reviewed on 10/6/2015 Name Date Influenza High Dose Vaccine PF 10/24/2016 Influenza Vaccine 11/24/2014 Influenza Vaccine Split 10/13/2011 Pneumococcal Conjugate (PCV-13) 10/6/2015 Pneumococcal Vaccine (Unspecified Type) 10/1/2013 Not reviewed this visit Vitals BP  
  
  
  
  
  
 132/78 (BP 1 Location: Left arm) Vitals History BMI and BSA Data Body Mass Index Body Surface Area 40.44 kg/m 2 2.25 m 2 Preferred Pharmacy Pharmacy Name Phone [facility] # [id number] - [place], [address] [place] [address] [phone] Your Updated Medication List  
  
   
This list is accurate as of: 2/28/17 10:50 AM.  Always use your most recent med list.  
  
  
  
  
 calcium-vitamin D 500 mg(1,250mg) -200 unit per tablet Commonly known as:  OYSTER SHELL Take 1 Tab by mouth daily. capecitabine 500 mg tablet Commonly known as:  XELODA TK 4 TABLETS PO BID FOR 14 DAYS  
  
 denosumab Soln injection Commonly known as:  XGEVA  
120 mg by SubCUTAneous route every thirty (30) days. omeprazole 40 mg capsule Commonly known as:  PRILOSEC Take  by mouth daily. predniSONE 20 mg tablet Commonly known as:  Nanine Knife Take 5 mg by mouth daily (with breakfast). VITAMIN B-6 100 mg tablet Generic drug:  pyridoxine (vitamin B6) Take 100 mg by mouth daily. Introducing [facility] & HEALTH SERVICES! Dear La Cruz: Thank you for requesting a Dodonation account. Our records indicate that you already have an active Dodonation account. You can access your account anytime at [email or web address] Did you know that you can access your hospital and ER discharge instructions at any time in Dodonation? You can also review all of your test results from your hospital stay or ER visit. Additional Information If you have questions, please visit the Frequently Asked Questions section of the Dodonation website at [email or web address] Remember, Dodonation is NOT to be used for urgent needs. For medical emergencies, dial 911. Now available from your iPhone and Android! Please provide this summary of care documentation to your next provider. Your primary care clinician is listed as Tana Ag. If you have any questions after today's visit, please call [phone].

## 2017-03-08 ENCOUNTER — CLINICAL SUPPORT (OUTPATIENT)
Dept: CARDIOLOGY CLINIC | Age: 72
End: 2017-03-08

## 2017-03-08 DIAGNOSIS — R06.02 SOB (SHORTNESS OF BREATH): Primary | ICD-10-CM

## 2017-03-08 DIAGNOSIS — R07.9 CHEST PAIN, UNSPECIFIED TYPE: ICD-10-CM

## 2017-03-08 NOTE — PROGRESS NOTES
See scanned report. Dr. Carolene Gaucher ordered study and Dr. Jayde Ferrer read study. ID verified per protocol.

## 2017-03-09 ENCOUNTER — CLINICAL SUPPORT (OUTPATIENT)
Dept: CARDIOLOGY CLINIC | Age: 72
End: 2017-03-09

## 2017-03-09 DIAGNOSIS — R06.09 DYSPNEA ON EXERTION: ICD-10-CM

## 2017-03-09 DIAGNOSIS — R07.9 CHEST PAIN, UNSPECIFIED TYPE: Primary | ICD-10-CM

## 2017-03-09 DIAGNOSIS — R06.02 SOB (SHORTNESS OF BREATH): ICD-10-CM

## 2017-03-10 ENCOUNTER — TELEPHONE (OUTPATIENT)
Dept: CARDIOLOGY CLINIC | Age: 72
End: 2017-03-10

## 2017-03-10 ENCOUNTER — DOCUMENTATION ONLY (OUTPATIENT)
Dept: CARDIOLOGY CLINIC | Age: 72
End: 2017-03-10

## 2017-03-10 DIAGNOSIS — Z01.818 PRE-OP TESTING: Primary | ICD-10-CM

## 2017-03-10 NOTE — PROGRESS NOTES
Abnormal stress nuc study  Ant ishcemia - SDS 5 ( intermediate risk) . Pt continues to have dyspnea  Will proceed with cardiac cath    [facility] +/- PCI/RHC consent    The risks (including but not limited to death, myocardial infarction, cerebrovascular accident, dysrhythmia, renal failure +/-dialysis, vascular complication, allergy, and/or need for emergency surgery), indications, benefits, and alternatives of cardiac catheterization +/- PCI have been explained in detail to the patient and family. Patient and family informed that if patient needs surgery  - it will be at [facility] as [facility] does not have onsite surgery. All questions answered to patient's satisfaction. Patient agrees to proceed with the procedure and  informed consent was obtained. Patient evaluated andcan be aDES candidate.         ASA 2  AW 2    Raiza Brasher MD., [facility]

## 2017-03-10 NOTE — PROGRESS NOTES
Right/Left cath + stress test . Npo after midnight,may take meds with sips. Will have las drawn prior to Pt advised of pre-procedure instructions.

## 2017-03-14 DIAGNOSIS — R07.89 OTHER CHEST PAIN: Primary | ICD-10-CM

## 2017-03-14 RX ORDER — SODIUM CHLORIDE 0.9 % (FLUSH) 0.9 %
5-10 SYRINGE (ML) INJECTION AS NEEDED
Status: CANCELLED | OUTPATIENT
Start: 2017-03-16

## 2017-03-14 RX ORDER — SODIUM CHLORIDE 0.9 % (FLUSH) 0.9 %
5-10 SYRINGE (ML) INJECTION EVERY 8 HOURS
Status: CANCELLED | OUTPATIENT
Start: 2017-03-16

## 2017-03-16 ENCOUNTER — HOSPITAL ENCOUNTER (OUTPATIENT)
Dept: CARDIAC CATH/INVASIVE PROCEDURES | Age: 72
Discharge: HOME | End: 2017-03-16
Attending: INTERNAL MEDICINE | Admitting: INTERNAL MEDICINE
Payer: MEDICARE

## 2017-03-16 VITALS
RESPIRATION RATE: 31 BRPM | BODY MASS INDEX: 40.09 KG/M2 | TEMPERATURE: 98 F | HEIGHT: 65 IN | WEIGHT: 240.63 LBS | HEART RATE: 119 BPM | OXYGEN SATURATION: 94 % | SYSTOLIC BLOOD PRESSURE: 123 MMHG | DIASTOLIC BLOOD PRESSURE: 62 MMHG

## 2017-03-16 DIAGNOSIS — R07.89 OTHER CHEST PAIN: ICD-10-CM

## 2017-03-16 LAB
ANION GAP BLD CALC-SCNC: 11 MMOL/L (ref 5–15)
BUN SERPL-MCNC: 8 MG/DL (ref 6–20)
BUN/CREAT SERPL: 9 (ref 12–20)
CALCIUM SERPL-MCNC: 8.8 MG/DL (ref 8.5–10.1)
CHLORIDE SERPL-SCNC: 104 MMOL/L (ref 97–108)
CO2 SERPL-SCNC: 25 MMOL/L (ref 21–32)
COHGB MFR BLD: 0.6 % (ref 1–2)
COHGB MFR BLD: 0.8 % (ref 1–2)
COHGB MFR BLD: 0.8 % (ref 1–2)
CREAT SERPL-MCNC: 0.87 MG/DL (ref 0.55–1.02)
ERYTHROCYTE [DISTWIDTH] IN BLOOD BY AUTOMATED COUNT: 18.7 % (ref 11.5–14.5)
GLUCOSE SERPL-MCNC: 101 MG/DL (ref 65–100)
HCT VFR BLD AUTO: 36.8 % (ref 35–47)
HGB BLD OXIMETRY-MCNC: 10.2 G/DL (ref 14–17)
HGB BLD OXIMETRY-MCNC: 10.2 G/DL (ref 14–17)
HGB BLD OXIMETRY-MCNC: 12 G/DL (ref 14–17)
HGB BLD-MCNC: 11.7 G/DL (ref 11.5–16)
MCH RBC QN AUTO: 31 PG (ref 26–34)
MCHC RBC AUTO-ENTMCNC: 31.8 G/DL (ref 30–36.5)
MCV RBC AUTO: 97.4 FL (ref 80–99)
METHGB MFR BLD: 0.4 % (ref 0–1.4)
METHGB MFR BLD: 0.6 % (ref 0–1.4)
METHGB MFR BLD: 0.7 % (ref 0–1.4)
OXYHGB MFR BLD: 61.7 % (ref 94–97)
OXYHGB MFR BLD: 67.2 % (ref 94–97)
OXYHGB MFR BLD: 90.6 % (ref 94–97)
PLATELET # BLD AUTO: 206 K/UL (ref 150–400)
POTASSIUM SERPL-SCNC: 3.5 MMOL/L (ref 3.5–5.1)
RBC # BLD AUTO: 3.78 M/UL (ref 3.8–5.2)
SAO2 % BLD: 63 % (ref 95–99)
SAO2 % BLD: 68 % (ref 95–99)
SAO2 % BLD: 92 % (ref 95–99)
SODIUM SERPL-SCNC: 140 MMOL/L (ref 136–145)
WBC # BLD AUTO: 4 K/UL (ref 3.6–11)

## 2017-03-16 PROCEDURE — C1894 INTRO/SHEATH, NON-LASER: HCPCS

## 2017-03-16 PROCEDURE — 99153 MOD SED SAME PHYS/QHP EA: CPT | Performed by: INTERNAL MEDICINE

## 2017-03-16 PROCEDURE — 80048 BASIC METABOLIC PNL TOTAL CA: CPT | Performed by: INTERNAL MEDICINE

## 2017-03-16 PROCEDURE — 74011250636 HC RX REV CODE- 250/636: Performed by: INTERNAL MEDICINE

## 2017-03-16 PROCEDURE — 74011636320 HC RX REV CODE- 636/320: Performed by: INTERNAL MEDICINE

## 2017-03-16 PROCEDURE — C1751 CATH, INF, PER/CENT/MIDLINE: HCPCS

## 2017-03-16 PROCEDURE — 36415 COLL VENOUS BLD VENIPUNCTURE: CPT | Performed by: INTERNAL MEDICINE

## 2017-03-16 PROCEDURE — 85027 COMPLETE CBC AUTOMATED: CPT | Performed by: INTERNAL MEDICINE

## 2017-03-16 PROCEDURE — 99152 MOD SED SAME PHYS/QHP 5/>YRS: CPT | Performed by: INTERNAL MEDICINE

## 2017-03-16 PROCEDURE — 77030004532 HC CATH ANGI DX IMP BSC -A

## 2017-03-16 PROCEDURE — 74011000250 HC RX REV CODE- 250: Performed by: INTERNAL MEDICINE

## 2017-03-16 PROCEDURE — 82375 ASSAY CARBOXYHB QUANT: CPT | Performed by: INTERNAL MEDICINE

## 2017-03-16 PROCEDURE — 93458 L HRT ARTERY/VENTRICLE ANGIO: CPT

## 2017-03-16 PROCEDURE — 77030029065 HC DRSG HEMO QCLOT ZMED -B

## 2017-03-16 RX ORDER — MIDAZOLAM HYDROCHLORIDE 1 MG/ML
.5-1 INJECTION, SOLUTION INTRAMUSCULAR; INTRAVENOUS
Status: DISCONTINUED | OUTPATIENT
Start: 2017-03-16 | End: 2017-03-16 | Stop reason: HOSPADM

## 2017-03-16 RX ORDER — CARVEDILOL 3.12 MG/1
3.12 TABLET ORAL 2 TIMES DAILY WITH MEALS
Status: DISCONTINUED | OUTPATIENT
Start: 2017-03-16 | End: 2017-03-16 | Stop reason: HOSPADM

## 2017-03-16 RX ORDER — HEPARIN SODIUM 200 [USP'U]/100ML
500 INJECTION, SOLUTION INTRAVENOUS
Status: DISCONTINUED | OUTPATIENT
Start: 2017-03-16 | End: 2017-03-16 | Stop reason: HOSPADM

## 2017-03-16 RX ORDER — SODIUM CHLORIDE 0.9 % (FLUSH) 0.9 %
5-10 SYRINGE (ML) INJECTION EVERY 8 HOURS
Status: DISCONTINUED | OUTPATIENT
Start: 2017-03-16 | End: 2017-03-16 | Stop reason: HOSPADM

## 2017-03-16 RX ORDER — SODIUM CHLORIDE 0.9 % (FLUSH) 0.9 %
5-10 SYRINGE (ML) INJECTION AS NEEDED
Status: DISCONTINUED | OUTPATIENT
Start: 2017-03-16 | End: 2017-03-16 | Stop reason: HOSPADM

## 2017-03-16 RX ORDER — ATORVASTATIN CALCIUM 10 MG/1
10 TABLET, FILM COATED ORAL DAILY
Qty: 30 TAB | Refills: 3 | Status: SHIPPED | OUTPATIENT
Start: 2017-03-17 | End: 2017-04-28 | Stop reason: SDUPTHER

## 2017-03-16 RX ORDER — ALBUTEROL SULFATE 1.25 MG/3ML
1.25 SOLUTION RESPIRATORY (INHALATION)
COMMUNITY
End: 2017-06-19 | Stop reason: ALTCHOICE

## 2017-03-16 RX ORDER — LIDOCAINE HYDROCHLORIDE 10 MG/ML
10-30 INJECTION INFILTRATION; PERINEURAL
Status: DISCONTINUED | OUTPATIENT
Start: 2017-03-16 | End: 2017-03-16 | Stop reason: HOSPADM

## 2017-03-16 RX ORDER — ISOSORBIDE MONONITRATE 30 MG/1
30 TABLET, EXTENDED RELEASE ORAL DAILY
Qty: 30 TAB | Refills: 3 | Status: SHIPPED | OUTPATIENT
Start: 2017-03-17 | End: 2017-06-19 | Stop reason: ALTCHOICE

## 2017-03-16 RX ORDER — FLUTICASONE PROPIONATE AND SALMETEROL 100; 50 UG/1; UG/1
1 POWDER RESPIRATORY (INHALATION) EVERY 12 HOURS
COMMUNITY
End: 2017-06-19 | Stop reason: ALTCHOICE

## 2017-03-16 RX ORDER — ATORVASTATIN CALCIUM 10 MG/1
10 TABLET, FILM COATED ORAL DAILY
Status: DISCONTINUED | OUTPATIENT
Start: 2017-03-17 | End: 2017-03-16 | Stop reason: HOSPADM

## 2017-03-16 RX ORDER — CARVEDILOL 3.12 MG/1
3.12 TABLET ORAL 2 TIMES DAILY WITH MEALS
Qty: 60 TAB | Refills: 3 | Status: SHIPPED | OUTPATIENT
Start: 2017-03-16 | End: 2017-04-28 | Stop reason: SDUPTHER

## 2017-03-16 RX ORDER — SODIUM CHLORIDE 9 MG/ML
75 INJECTION, SOLUTION INTRAVENOUS CONTINUOUS
Status: DISCONTINUED | OUTPATIENT
Start: 2017-03-16 | End: 2017-03-16 | Stop reason: HOSPADM

## 2017-03-16 RX ORDER — FENTANYL CITRATE 50 UG/ML
25-200 INJECTION, SOLUTION INTRAMUSCULAR; INTRAVENOUS
Status: DISCONTINUED | OUTPATIENT
Start: 2017-03-16 | End: 2017-03-16 | Stop reason: HOSPADM

## 2017-03-16 RX ORDER — ISOSORBIDE MONONITRATE 30 MG/1
30 TABLET, EXTENDED RELEASE ORAL DAILY
Status: DISCONTINUED | OUTPATIENT
Start: 2017-03-17 | End: 2017-03-16 | Stop reason: HOSPADM

## 2017-03-16 RX ADMIN — IOPAMIDOL 75 ML: 755 INJECTION, SOLUTION INTRAVENOUS at 11:50

## 2017-03-16 RX ADMIN — MIDAZOLAM HYDROCHLORIDE 1 MG: 1 INJECTION, SOLUTION INTRAMUSCULAR; INTRAVENOUS at 11:17

## 2017-03-16 RX ADMIN — LIDOCAINE HYDROCHLORIDE 20 ML: 10 INJECTION, SOLUTION INFILTRATION; PERINEURAL at 11:22

## 2017-03-16 RX ADMIN — MIDAZOLAM HYDROCHLORIDE 1 MG: 1 INJECTION, SOLUTION INTRAMUSCULAR; INTRAVENOUS at 11:22

## 2017-03-16 RX ADMIN — HEPARIN SODIUM 1000 UNITS: 200 INJECTION, SOLUTION INTRAVENOUS at 11:14

## 2017-03-16 RX ADMIN — FENTANYL CITRATE 50 MCG: 50 INJECTION, SOLUTION INTRAMUSCULAR; INTRAVENOUS at 11:22

## 2017-03-16 RX ADMIN — FENTANYL CITRATE 50 MCG: 50 INJECTION, SOLUTION INTRAMUSCULAR; INTRAVENOUS at 11:13

## 2017-03-16 RX ADMIN — HEPARIN SODIUM 1000 UNITS: 200 INJECTION, SOLUTION INTRAVENOUS at 11:15

## 2017-03-16 NOTE — DISCHARGE INSTRUCTIONS
Cardiac Catheterization/Angiography Discharge Instructions    *Check the puncture site frequently for swelling or bleeding. If you see any bleeding, lie down and apply pressure over the area with a clean town or washcloth. Notify your doctor for any redness, swelling, drainage or oozing from the puncture site. Notify your doctor for any fever or chills. *If the leg or arm with the puncture becomes cold, numb or painful, call Dr Mahogany David at  [phone]    *Activity should be limited for the next 48 hours. Climb stairs as little as possible and avoid any stooping, bending or strenuous activity for 48 hours. No heavy lifting (anything over 10 pounds) for three days. *Do not drive for 48 hours. *You may resume your usual diet. Drink more fluids than usual.    *Have a responsible person drive you home and stay with you for at least 24 hours after your heart catheterization/angiography. *You may remove the bandage from your Right  Groin in 24 hours. You may shower in 24 hours. No tub baths, hot tubs or swimming for one week. Do not place any lotions, creams, powders, ointments over the puncture site for one week. You may place a clean band-aid over the puncture site each day for 5 days. Change this daily.

## 2017-03-16 NOTE — IP AVS SNAPSHOT
[facility] 
 
 
 [address] [facility] [place] [address] 
[phone] Patient: Willard Fuentes MRN: [id number] :1945 You are allergic to the following Allergen Reactions Afinitor (Everolimus) Other (comments) \"inflammation in my lungs\" Recent Documentation Height Weight Breastfeeding? BMI OB Status Smoking Status 1.651 m 109.1 kg No 40.04 kg/m2 Postmenopausal Never Smoker Emergency Contacts Name Discharge Info Relation Home Work [facility] [facility] [3] Spouse [3] [phone] About your hospitalization You were admitted on:  2017 You last received care in the:  [facility] PAC[facility] You were discharged on:  2017 Unit phone number:  [phone] Why you were hospitalized Your primary diagnosis was:  Not on File Providers Seen During Your Hospitalizations Provider Role Specialty Primary office phone Kimberly Asencio MD Attending Provider Cardiology [phone] Your Primary Care Physician (PCP) Primary Care Physician Office Phone Office Fax [place], [address] [facility] [phone] Follow-up Information Follow up With Details Comments Contact Info Tino Humphreys MD   [place] [address] [address] [facility] [address] 
[phone] Current Discharge Medication List  
  
START taking these medications Dose & Instructions Dispensing Information Comments Morning Noon Evening Bedtime  
 atorvastatin 10 mg tablet Commonly known as:  LIPITOR Start taking on:  3/17/2017 Your last dose was: Your next dose is:    
   
   
 Dose:  10 mg Take 1 Tab by mouth daily. Quantity:  30 Tab Refills:  3  
     
   
   
   
  
 carvedilol 3.125 mg tablet Commonly known as:  Calderon Darby Your last dose was: Your next dose is:    
   
   
 Dose:  3.125 mg Take 1 Tab by mouth two (2) times daily (with meals). Quantity:  60 Tab Refills:  3  
     
   
   
   
  
 isosorbide mononitrate ER 30 mg tablet Commonly known as:  IMDUR Start taking on:  3/17/2017 Your last dose was: Your next dose is:    
   
   
 Dose:  30 mg Take 1 Tab by mouth daily. Quantity:  30 Tab Refills:  3 CONTINUE these medications which have NOT CHANGED Dose & Instructions Dispensing Information Comments Morning Noon Evening Bedtime ADVAIR DISKUS 100-50 mcg/dose diskus inhaler Generic drug:  fluticasone-salmeterol Your last dose was: Your next dose is:    
   
   
 Dose:  1 Puff Take 1 Puff by inhalation every twelve (12) hours. Refills:  0  
     
   
   
   
  
 albuterol 1.25 mg/3 mL Nebu Commonly known as:  Jearl Power Your last dose was: Your next dose is:    
   
   
 Dose:  1.25 mg  
1.25 mg by Nebulization route every six (6) hours as needed. Refills:  0  
     
   
   
   
  
 calcium-vitamin D 500 mg(1,250mg) -200 unit per tablet Commonly known as:  OYSTER SHELL Your last dose was: Your next dose is:    
   
   
 Dose:  1 Tab Take 1 Tab by mouth daily. Refills:  0  
     
   
   
   
  
 capecitabine 500 mg tablet Commonly known as:  XELODA Your last dose was: Your next dose is:    
   
   
 TK 4 TABLETS PO BID FOR 14 DAYS Refills:  6  
     
   
   
   
  
 denosumab Soln injection Commonly known as:  Shea Lack Your last dose was: Your next dose is:    
   
   
 Dose:  120 mg  
120 mg by SubCUTAneous route every thirty (30) days. Refills:  0  
     
   
   
   
  
 omeprazole 40 mg capsule Commonly known as:  PRILOSEC Your last dose was: Your next dose is: Take  by mouth daily. Refills:  0  
     
   
   
   
  
 VITAMIN B-6 100 mg tablet Generic drug:  pyridoxine (vitamin B6) Your last dose was: Your next dose is:    
   
   
 Dose:  100 mg Take 100 mg by mouth daily. Refills:  0 Where to Get Your Medications These medications were sent to [facility] [address] [facility] [address], [address]  [address] [place] [address] Phone:  [phone]  
  atorvastatin 10 mg tablet  
 carvedilol 3.125 mg tablet  
 isosorbide mononitrate ER 30 mg tablet Discharge Instructions Cardiac Catheterization/Angiography Discharge Instructions *Check the puncture site frequently for swelling or bleeding. If you see any bleeding, lie down and apply pressure over the area with a clean town or washcloth. Notify your doctor for any redness, swelling, drainage or oozing from the puncture site. Notify your doctor for any fever or chills. *If the leg or arm with the puncture becomes cold, numb or painful, call Dr Fabio Head at  [phone] *Activity should be limited for the next 48 hours. Climb stairs as little as possible and avoid any stooping, bending or strenuous activity for 48 hours. No heavy lifting (anything over 10 pounds) for three days. *Do not drive for 48 hours. *You may resume your usual diet. Drink more fluids than usual. 
 
*Have a responsible person drive you home and stay with you for at least 24 hours after your heart catheterization/angiography. *You may remove the bandage from your Right  Groin in 24 hours. You may shower in 24 hours. No tub baths, hot tubs or swimming for one week. Do not place any lotions, creams, powders, ointments over the puncture site for one week. You may place a clean band-aid over the puncture site each day for 5 days. Change this daily. Discharge Orders None ACO Transitions of Care Introducing [employer] [address] Matos offers a voluntary care coordination program to provide high quality service and care to [facility] fee-for-service beneficiaries. Arash Shoemaker was designed to help you enhance your health and well-being through the following services: ? Transitions of Care  support for individuals who are transitioning from one care setting to another (example: Hospital to home). ? Chronic and Complex Care Coordination  support for individuals and caregivers of those with serious or chronic illnesses or with more than one chronic (ongoing) condition and those who take a number of different medications. If you meet specific medical criteria, a [address] [place] [address] Rd may call you directly to coordinate your care with your primary care physician and your other care providers. For questions about the [facility] programs, please, contact your physicians office. For general questions or additional information about Accountable Care Organizations: 
Please visit www.medicare.gov/acos. html or call 1-800-MEDICARE (7-259.429.8057) TTY users should call 3-295.419.7137. Introducing [facility] & HEALTH SERVICES! Dear Jaden Hernandez: Thank you for requesting a [employer] account. Our records indicate that you already have an active [employer] account. You can access your account anytime at [email or web address] Did you know that you can access your hospital and ER discharge instructions at any time in [employer]? You can also review all of your test results from your hospital stay or ER visit. Additional Information If you have questions, please visit the Frequently Asked Questions section of the [employer] website at [email or web address] Remember, [employer] is NOT to be used for urgent needs. For medical emergencies, dial 911. Now available from your iPhone and Android! General Information Please provide this summary of care documentation to your next provider. Patient Signature:  ____________________________________________________________ Date:  ____________________________________________________________  
  
Burlene Alexander Provider Signature:  ____________________________________________________________ Date:  ____________________________________________________________

## 2017-03-16 NOTE — PROGRESS NOTES
9:25 AM  Patient arrived. ID and allergies verified verbally with patient. Pt voices understanding of procedure to be performed. Consent obtained. Pt prepped for procedure. Patient and family oriented to fall prevention protocol. Understanding verbalized. Yellow band and socks applied    1100  TRANSFER - OUT REPORT:    Verbal report given to Mona(name) on Gayle Arita  being transferred to cath lab(unit) for ordered procedure       Report consisted of patients Situation, Background, Assessment and   Recommendations(SBAR). Information from the following report(s) SBAR was reviewed with the receiving nurse. Lines:   Peripheral IV 03/16/17 Right Antecubital (Active)        Opportunity for questions and clarification was provided. Patient transported with:   Registered Nurse    12:04 PM  TRANSFER - IN REPORT:    Verbal report received from [employer]) on Pasquale Samuels  being received from cath lab(unit) for routine post - op      Report consisted of patients Situation, Background, Assessment and   Recommendations(SBAR). Information from the following report(s) SBAR, Procedure Summary and MAR was reviewed with the receiving nurse. Opportunity for questions and clarification was provided. Assessment completed upon patients arrival to unit and care assumed. Pt voices understanding of post procedure bedrest instructions. 12:15 PM  Hemostasis achieved  Sterile dry dressing applied    1215  Hemostasis achieved  After 10 min of hand held pressure with quick clot    1430  Discharge instructions reviewed with patient and family. Voiced understanding. Patient given copy of discharge instructions to take home.     3:24 PM  Pt discharged off uniot via wheelchair care of pt turned over to

## 2017-03-16 NOTE — PROCEDURES
BRIEF PROCEDURE NOTE    Date of Procedure: 3/16/2017   Preoperative Diagnosis: Pos stress test  Postoperative Diagnosis: LAD - mid 50%/ Non obstructive    Procedure: Left heart cath, LV angiography, coronary angiography  Interventional Cardiologist: Isha Mckeon MD  Anesthesia: local + IV sedation  Estimated Blood Loss: Minimal  Findings:     L Main: Large/MLI    LAD: Med to large; Mid 50%; D1 - MLI    LCflex: Med to large - MLI; OM1 - MLI    RCA: DOminant; Large/MLI    LVEDP: WNL    LVEF: not assessed    No significant gradient across aortic valve. RHC findings:    RAPm=  9     mmHg  RVSP=  36   mmHg  PAPm=26        mmHg  PCWP=  14  mmHg    Specimens Removed : None    Closure Device: Manual          See full cath note.     Complications: none    Isha Mckeon MD

## 2017-03-16 NOTE — INTERVAL H&P NOTE
H&P Update:  Pasquale Samuels was seen and examined. History and physical has been reviewed. The patient has been examined.  There have been no significant clinical changes since the completion of the originally dated History and Physical.    Signed By: Kwaku Farley MD     March 16, 2017 11:05 AM

## 2017-03-16 NOTE — PROGRESS NOTES
TRANSFER - IN REPORT:    Verbal report received from Melody Moyer RN(name) on Lawrence Aguila  being received from [facility](unit) for routine progression of care      Report consisted of patients Situation, Background, Assessment and   Recommendations(SBAR). Information from the following report(s) SBAR was reviewed with the receiving nurse. Opportunity for questions and clarification was provided. Assessment completed upon patients arrival to unit and care assumed.

## 2017-03-16 NOTE — PROGRESS NOTES
TRANSFER - OUT REPORT:    Verbal report given to PRANAV Hines(name) on Gayle Arita  being transferred to [facility](unit) for routine progression of care       Report consisted of patients Situation, Background, Assessment and   Recommendations(SBAR). Information from the following report(s) SBAR and Procedure Summary was reviewed with the receiving nurse. Lines:   Peripheral IV 03/16/17 Right Antecubital (Active)        Opportunity for questions and clarification was provided.       Patient transported with:   Registered Nurse

## 2017-03-31 ENCOUNTER — OFFICE VISIT (OUTPATIENT)
Dept: CARDIOLOGY CLINIC | Age: 72
End: 2017-03-31

## 2017-03-31 VITALS
DIASTOLIC BLOOD PRESSURE: 78 MMHG | SYSTOLIC BLOOD PRESSURE: 108 MMHG | OXYGEN SATURATION: 97 % | WEIGHT: 239.8 LBS | HEART RATE: 111 BPM | BODY MASS INDEX: 39.95 KG/M2 | HEIGHT: 65 IN | RESPIRATION RATE: 20 BRPM

## 2017-03-31 DIAGNOSIS — Z98.890 STATUS POST CARDIAC CATHETERIZATION: ICD-10-CM

## 2017-03-31 DIAGNOSIS — R06.02 SOB (SHORTNESS OF BREATH): Primary | ICD-10-CM

## 2017-03-31 RX ORDER — GUAIFENESIN 600 MG/1
600 TABLET, EXTENDED RELEASE ORAL 2 TIMES DAILY
COMMUNITY
End: 2017-06-19 | Stop reason: ALTCHOICE

## 2017-03-31 NOTE — MR AVS SNAPSHOT
Visit Information Date & Time Provider Department Dept. Phone Encounter #  
 3/31/2017  8:40 AM Madison Sun MD [facility] [phone] [id number] Your Appointments 5/30/2017 10:20 AM  
ESTABLISHED PATIENT with Madison Sun MD  
[facility] Appt Note: 3 MO [facility] [address] [facility] [address]  
[phone]  
  
   
 700 [facility] [address] [address]  
  
    
 6/20/2017 10:00 AM  
ECHO CARDIOGRAMS 2D with KILLIAN FAJARDO  
[facility] Appt Note: 3 mo fup echo at 10 at 11  20dr [facility] [address] [facility]  
[phone]  
  
   
 [address] [place] [address]  
  
    
 6/20/2017 11:20 AM  
ESTABLISHED PATIENT with Madison Sun MD  
[facility] Appt Note: 3 mo fup echo at 9 at 10 dr p; 3 mo fup echo at 10 at 11  20dr p  
 700 [facility] 600 [address] [facility]  
[phone] Upcoming Health Maintenance Date Due DTaP/Tdap/Td series (1 - Tdap) 12/16/1967 ZOSTER VACCINE AGE 60> 12/16/2006 GLAUCOMA SCREENING Q2Y 5/1/2016 MEDICARE YEARLY EXAM 10/6/2016 BREAST CANCER SCRN MAMMOGRAM 2/1/2018 COLONOSCOPY 9/15/2026 Allergies as of 3/31/2017  Review Complete On: 3/31/2017 By: Jessica Harry Severity Noted Reaction Type Reactions Afinitor [Everolimus]  01/23/2017    Other (comments) \"inflammation in my lungs\" Current Immunizations  Reviewed on 10/6/2015 Name Date Influenza High Dose Vaccine PF 10/24/2016 Influenza Vaccine 11/24/2014 Influenza Vaccine Split 10/13/2011 Pneumococcal Conjugate (PCV-13) 10/6/2015 Pneumococcal Vaccine (Unspecified Type) 10/1/2013 Not reviewed this visit You Were Diagnosed With   
  
 Codes Comments SOB (shortness of breath)    -  Primary ICD-10-CM: R06.02 
ICD-9-CM: 786.05 Status post cardiac catheterization     ICD-10-CM: Z98.890 ICD-9-CM: V45.89 Vitals BP Pulse Resp Height(growth percentile) Weight(growth percentile) LMP  
 108/78 (BP 1 Location: Right arm) (!) 111 20 5' 5\" (1.651 m) 239 lb 12.8 oz (108.8 kg) 01/28/2000 SpO2 BMI OB Status Smoking Status 97% 39.9 kg/m2 Postmenopausal Never Smoker Vitals History BMI and BSA Data Body Mass Index Body Surface Area  
 39.9 kg/m 2 2.23 m 2 Preferred Pharmacy Pharmacy Name Phone [id number] [place] [address] [phone] Your Updated Medication List  
  
   
This list is accurate as of: 3/31/17  9:08 AM.  Always use your most recent med list.  
  
  
  
  
 ADVAIR DISKUS 100-50 mcg/dose diskus inhaler Generic drug:  fluticasone-salmeterol Take 1 Puff by inhalation every twelve (12) hours. albuterol 1.25 mg/3 mL Nebu Commonly known as:  ACCUNEB  
1.25 mg by Nebulization route every six (6) hours as needed. atorvastatin 10 mg tablet Commonly known as:  LIPITOR Take 1 Tab by mouth daily. calcium-vitamin D 500 mg(1,250mg) -200 unit per tablet Commonly known as:  OYSTER SHELL Take 1 Tab by mouth daily. capecitabine 500 mg tablet Commonly known as:  XELODA TK 4 TABLETS PO BID FOR 14 DAYS  
  
 carvedilol 3.125 mg tablet Commonly known as:  Mar [place] Take 1 Tab by mouth two (2) times daily (with meals). denosumab Soln injection Commonly known as:  XGEVA  
120 mg by SubCUTAneous route every thirty (30) days. isosorbide mononitrate ER 30 mg tablet Commonly known as:  IMDUR Take 1 Tab by mouth daily. MUCINEX 600 mg ER tablet Generic drug:  guaiFENesin ER Take 600 mg by mouth two (2) times a day. omeprazole 40 mg capsule Commonly known as:  PRILOSEC  
 Take  by mouth daily. VITAMIN B-6 100 mg tablet Generic drug:  pyridoxine (vitamin B6) Take 100 mg by mouth daily. We Performed the Following AMB POC EKG ROUTINE W/ 12 LEADS, INTER & REP [91005 CPT(R)] To-Do List   
 03/31/2017 ECHO:  2D ECHO COMPLETE ADULT (TTE) W OR WO CONTR   
  
 04/10/2017 9:00 AM  
  Appointment with [facility] CT 2 at [facility] CT ([phone]) CONTRAST STUDY: 1. The patient should not eat solid food four hours before the appointment but should be encouraged to drink clear liquids. 2.  If you have to drink oral contrast, please pick it up any weekday prior to your appointment, if you cannot please check in 2 hrs before appt time. 3.  The patient will require IV access for contrast administration. 4.  The patient should not take Ibuprofen (Advil, Motrin, etc.) and Naproxen Sodium (Aleve, etc.)  on the day of the exam. Stopping non-steroidal anti-inflammatory agents (NSAIDs) like Ibuprofen decreases the risk of kidney damage from the x-ray contrast (dye). 5.  Bring any non [facility] facility films/images pertaining to the area of interest with you on the day of appointment. 6.  Bring current lab work if available (within last 90 days [facility]) ***[facility], labs will need to be done before appointment*** 7. Check in at registration at least 30 minutes before appt time unless you were instructed to do otherwise. Patient Instructions You will need to follow up in clinic with Dr. Fabio Head in 3 months with an echo. [facility] & HEALTH SERVICES! Dear Alecia Bill: Thank you for requesting a [employer] account. Our records indicate that you already have an active [employer] account. You can access your account anytime at [email or web address] Did you know that you can access your hospital and ER discharge instructions at any time in [employer]?   You can also review all of your test results from your hospital stay or ER visit. Additional Information If you have questions, please visit the Frequently Asked Questions section of the [employer] website at [email or web address] Remember, [employer] is NOT to be used for urgent needs. For medical emergencies, dial 911. Now available from your iPhone and Android! Please provide this summary of care documentation to your next provider. Your primary care clinician is listed as Catie Farnsworth. If you have any questions after today's visit, please call [phone].

## 2017-03-31 NOTE — PROGRESS NOTES
Iraj Resendez MD    [id number] [facility] [place], [address] [facility]    Office [phone],TWA [phone]  Pager [phone]    Isamar Bueno is a 80 y.o. female is here for f/u visit. Primary care physician:  Clary Amanda MD    Patient Active Problem List   Diagnosis Code    Breast cancer, stage 3 ([facility] [address] C50.919    Pneumonia J18.9    SIRS (systemic inflammatory response syndrome) (HCC) R65.10    Transaminitis R74.0    Lactic acidosis E87.2       Dear Dr. Court Garcia,    I had the pleasure of seeing Ms. Isamar Bueno in the office today. Chief complaint:  Chief Complaint   Patient presents with    Follow-up     from cath       Assessment:  Dyspnea -  Left breast cancer metastasized to bone-recent chemotherapy caused pneumonitis for which he is on a weaning dose of steroids. (History of radiation treatment to the chest)  GERD-improved  Edema-patient is on a new chemotherapy drug and her swelling in the leg started after this medication. Asthma      Plan:   Patient doing well post cath. She did not have any significant CAD on the cath. There was a question of possible apical ballooning of the LV gram consistent with Takotsubo's CMP with LVEF of 45%. However prior echoes indicate normal EF. Patient is also clinically euvolemic. Stop Imdur. If she could remains tachycardic and her blood pressure is doing well, we can increase her Coreg. Will re-assess next visit. She is followed by pulmonology. She had pneumonitis from her previous chemotherapy medication. She has also been told that she has asthma and uses inhalers. Patient continues to have dyspnea. I also asked her to discuss her dyspnea with the pulmonologist.    Aggressive cardiovascular risk factor modification. Follow-up in 3 months. Echo prior to visit. Patient understands the plan. All questions were answered to the patient's satisfaction.     Medication Side Effects and Warnings were discussed with patient: yes  Patient Labs were reviewed and or requested:  yes  Patient Past Records were reviewed and or requested: yes    I appreciate the opportunity to be involved in Ms. Love. See note below for details. Please do not hesitate to contact us with questions or concerns. Mary Figueroa MD    Cardiac Testing/ Procedures: A. Cardiac Cath/PCI: 3/16/17 Tortuous Coronaries    L Main: Nml    LAD: Med to Large; MLI; D1 - MLI    LCflex: Large; MLI; OM1 - MLI    RCA: Dominant Med to large; MLI    LVEDP: WNL    LVEF: 45%/ Apical ballooning - c/w Takotsubo's CMP    No significant gradient across aortic valve. Specimens Removed : None    B.ECHO/ZAID: 2/6/11 Left ventricle: Systolic function was normal. Ejection fraction was  estimated in the range of 55 % to 60 %. There were no regional wall motion  abnormalities. 3/30/11 - Left ventricle: Systolic function was normal. Ejection fraction was  estimated in the range of 55 % to 60 %. There were no regional wall motion  abnormalities. C.StressNuclear/Stress ECHO/Stress test:    D.Vascular:    E. EP:    F. Miscellaneous:    Subjective:  Milton Rojas is a 80 y.o. female who returns for follow up visit. Patient continues to have dyspnea with exertion. No chest pain. No dizziness. (1/31/17  Initial visit- Patient is a pleasant 67-year-old  female who has a history of breast cancer status post left lumpectomy with radiation treatment to the chest twice. She has been on chemotherapy. She states that her breast cancer has metastasized to the bone-spine/hips/sternum/ribs. She was on a medication for her cancer recently which caused her to have pneumonitis. She is off that medication. She has seen  (pulmonologist) who has treated the pneumonitis with steroids. She developed GERD because of the steroids and is now on a PPI.  On 1/23/17 she came to the emergency room because she had an episode of diaphoresis, epigastric pain, dizziness when she stood up. CT scan was negative for PE. Troponin was negative. EKG showed sinus tachycardia.)      ROS:  (bold if positive, if negative)             Medications before admission:    Current Outpatient Prescriptions   Medication Sig Dispense    guaiFENesin ER (MUCINEX) 600 mg ER tablet Take 600 mg by mouth two (2) times a day.  fluticasone-salmeterol (ADVAIR DISKUS) 100-50 mcg/dose diskus inhaler Take 1 Puff by inhalation every twelve (12) hours.  albuterol (ACCUNEB) 1.25 mg/3 mL nebu 1.25 mg by Nebulization route every six (6) hours as needed.  atorvastatin (LIPITOR) 10 mg tablet Take 1 Tab by mouth daily. 30 Tab    carvedilol (COREG) 3.125 mg tablet Take 1 Tab by mouth two (2) times daily (with meals). 60 Tab    isosorbide mononitrate ER (IMDUR) 30 mg tablet Take 1 Tab by mouth daily. 30 Tab    capecitabine (XELODA) 500 mg tablet TK 4 TABLETS PO BID FOR 14 DAYS     omeprazole (PRILOSEC) 40 mg capsule Take  by mouth daily.  denosumab (XGEVA) soln injection 120 mg by SubCUTAneous route every thirty (30) days.  pyridoxine (VITAMIN B-6) 100 mg tablet Take 100 mg by mouth daily.  calcium-vitamin D (OYSTER SHELL) 500 mg(1,250mg) -200 unit per tablet Take 1 Tab by mouth daily. No current facility-administered medications for this visit. Family History of CAD:    Yes    Social History:  Current  Smoker  No    Physical Exam:       Visit Vitals    /78 (BP 1 Location: Right arm)    Pulse (!) 111    Resp 20    Ht 5' 5\" (1.651 m)    Wt 239 lb 12.8 oz (108.8 kg)    LMP 01/28/2000    SpO2 97%    BMI 39.9 kg/m2         Gen: Well-developed, well-nourished, in no acute distress  Neck: Supple,No JVD, No Carotid Bruit,   Resp: No accessory muscle use, Clear breath sounds, No rales or rhonchi  Card: Regular Rate,Rythm,Normal S1, S2, No murmurs, rubs or gallop. No thrills.    Abd:  Soft, non-tender, non-distended,BS+,   MSK: No cyanosis  Skin: No rashes Neuro: moving all four extremities , follows commands appropriately  Psych:  Good insight, oriented to person, place , alert, Nml Affect  LE: No edema    EKG:       LABS:        Lab Results   Component Value Date/Time    WBC 4.0 03/16/2017 09:41 AM    HGB 11.7 03/16/2017 09:41 AM    HCT 36.8 03/16/2017 09:41 AM    PLATELET 600 60/21/4023 09:41 AM     Lab Results   Component Value Date/Time    Sodium 140 03/16/2017 09:41 AM    Potassium 3.5 03/16/2017 09:41 AM    Chloride 104 03/16/2017 09:41 AM    CO2 25 03/16/2017 09:41 AM    Anion gap 11 03/16/2017 09:41 AM    Glucose 101 03/16/2017 09:41 AM    BUN 8 03/16/2017 09:41 AM    Creatinine 0.87 03/16/2017 09:41 AM    BUN/Creatinine ratio 9 03/16/2017 09:41 AM    GFR est AA >60 03/16/2017 09:41 AM    GFR est non-AA >60 03/16/2017 09:41 AM    Calcium 8.8 03/16/2017 09:41 AM       Lab Results   Component Value Date/Time    aPTT 30.6 10/04/2016 03:21 PM     Lab Results   Component Value Date/Time    INR 1.0 01/23/2017 02:54 PM    INR 1.1 10/04/2016 03:21 PM    INR 0.9 02/11/2016 09:00 AM    Prothrombin time 10.0 01/23/2017 02:54 PM    Prothrombin time 10.5 10/04/2016 03:21 PM    Prothrombin time 9.5 02/11/2016 09:00 AM     No components found for: Shu Meyer MD

## 2017-04-10 ENCOUNTER — HOSPITAL ENCOUNTER (OUTPATIENT)
Dept: CT IMAGING | Age: 72
Discharge: HOME | End: 2017-04-10
Attending: INTERNAL MEDICINE
Payer: MEDICARE

## 2017-04-10 DIAGNOSIS — C50.412 MALIGNANT NEOPLASM OF UPPER-OUTER QUADRANT OF LEFT FEMALE BREAST (HCC): ICD-10-CM

## 2017-04-10 PROCEDURE — 74177 CT ABD & PELVIS W/CONTRAST: CPT

## 2017-04-10 PROCEDURE — 74011636320 HC RX REV CODE- 636/320: Performed by: RADIOLOGY

## 2017-04-10 RX ADMIN — IOPAMIDOL 95 ML: 755 INJECTION, SOLUTION INTRAVENOUS at 09:21

## 2017-05-10 RX ORDER — ATORVASTATIN CALCIUM 10 MG/1
TABLET, FILM COATED ORAL
Qty: 30 TAB | Refills: 3 | Status: SHIPPED | OUTPATIENT
Start: 2017-05-10 | End: 2017-09-18 | Stop reason: SDUPTHER

## 2017-05-10 RX ORDER — CARVEDILOL 3.12 MG/1
TABLET ORAL
Qty: 60 TAB | Refills: 3 | Status: SHIPPED | OUTPATIENT
Start: 2017-05-10 | End: 2017-09-18 | Stop reason: SDUPTHER

## 2017-06-19 ENCOUNTER — OFFICE VISIT (OUTPATIENT)
Dept: CARDIOLOGY CLINIC | Age: 72
End: 2017-06-19

## 2017-06-19 ENCOUNTER — CLINICAL SUPPORT (OUTPATIENT)
Dept: CARDIOLOGY CLINIC | Age: 72
End: 2017-06-19

## 2017-06-19 VITALS
OXYGEN SATURATION: 98 % | RESPIRATION RATE: 22 BRPM | SYSTOLIC BLOOD PRESSURE: 108 MMHG | HEART RATE: 100 BPM | DIASTOLIC BLOOD PRESSURE: 80 MMHG | WEIGHT: 235 LBS | HEIGHT: 65 IN | BODY MASS INDEX: 39.15 KG/M2

## 2017-06-19 DIAGNOSIS — R06.02 SOB (SHORTNESS OF BREATH): ICD-10-CM

## 2017-06-19 DIAGNOSIS — Z98.890 STATUS POST CARDIAC CATHETERIZATION: ICD-10-CM

## 2017-06-19 DIAGNOSIS — R06.00 DYSPNEA, UNSPECIFIED TYPE: Primary | ICD-10-CM

## 2017-06-19 NOTE — PROGRESS NOTES
Diana Gracia MD    [id number] [place], [address] [facility]    Office [phone],XYO [phone]  Pager [phone]    Lani Byrnes is a 80 y.o. female is here for f/u visit. Primary care physician:  Kavitha Feliciano MD    Patient Active Problem List   Diagnosis Code    Breast cancer, stage 3 ([facility] [address] C50.919    Pneumonia J18.9    SIRS (systemic inflammatory response syndrome) (HCC) R65.10    Transaminitis R74.0    Lactic acidosis E87.2       Dear Dr. Selena Antony,    I had the pleasure of seeing Ms. Lani Byrnes in the office today. Chief complaint:  No chief complaint on file. Assessment:  Dyspnea/Cough -probably noncardiac etiology  Left breast cancer metastasized to bone-recent chemotherapy caused pneumonitis for which he is on a weaning dose of steroids. (History of radiation treatment to the chest)  GERD-improved  Asthma      Plan:   Normal coronaries by cath 3/60/17. Echo 6/90/17-normal EF. Her dyspnea is probably noncardiac in origin. Clinically patient is euvolemic. Continue Coreg. She is followed by pulmonology. She had pneumonitis from her previous chemotherapy medication. She has also been told that she has asthma and uses inhalers. Patient continues to have dyspnea. .She recently saw him a month ago. Advised follow-up with pulmonary. Aggressive cardiovascular risk factor modification. Follow-up in6 months. Patient understands the plan. All questions were answered to the patient's satisfaction. Medication Side Effects and Warnings were discussed with patient: yes  Patient Labs were reviewed and or requested:  yes  Patient Past Records were reviewed and or requested: yes    I appreciate the opportunity to be involved in Ms. Love. See note below for details. Please do not hesitate to contact us with questions or concerns. Diana Gracia MD    Cardiac Testing/ Procedures: A. Cardiac Cath/PCI: 3/16/17 Tortuous Coronaries    L Main: Nml    LAD: Med to Large; MLI; D1 - MLI    LCflex: Large; MLI; OM1 - MLI    RCA: Dominant Med to large; MLI    LVEDP: WNL    LVEF: 45%/ Apical ballooning - c/w Takotsubo's CMP    No significant gradient across aortic valve. Specimens Removed : None    B.ECHO/ZAID:  ECHO 6/19/17 EF 55%    2/6/11 Left ventricle: Systolic function was normal. Ejection fraction was  estimated in the range of 55 % to 60 %. There were no regional wall motion  abnormalities. 3/30/11 - Left ventricle: Systolic function was normal. Ejection fraction was  estimated in the range of 55 % to 60 %. There were no regional wall motion  abnormalities. C.StressNuclear/Stress ECHO/Stress test:    D.Vascular:    E. EP:    F. Miscellaneous:    Subjective:  Silverio Hutchins is a 80 y.o. female who returns for follow up visit. Patient continues to have dyspnea with exertion. No chest pain. No dizziness. (1/31/17  Initial visit- Patient is a pleasant 71-year-old  female who has a history of breast cancer status post left lumpectomy with radiation treatment to the chest twice. She has been on chemotherapy. She states that her breast cancer has metastasized to the bone-spine/hips/sternum/ribs. She was on a medication for her cancer recently which caused her to have pneumonitis. She is off that medication. She has seen  (pulmonologist) who has treated the pneumonitis with steroids. She developed GERD because of the steroids and is now on a PPI. On 1/23/17 she came to the emergency room because she had an episode of diaphoresis, epigastric pain, dizziness when she stood up. CT scan was negative for PE. Troponin was negative. EKG showed sinus tachycardia.)      ROS:  (bold if positive, if negative)             Medications before admission:    Current Outpatient Prescriptions   Medication Sig Dispense    atorvastatin (LIPITOR) 10 mg tablet TAKE 1 TAB BY MOUTH DAILY.  30 Tab    carvedilol (COREG) 3.125 mg tablet TAKE 1 TABLET BY MOUTH TWICE A DAY WITH MEALS 60 Tab    guaiFENesin ER (MUCINEX) 600 mg ER tablet Take 600 mg by mouth two (2) times a day.  fluticasone-salmeterol (ADVAIR DISKUS) 100-50 mcg/dose diskus inhaler Take 1 Puff by inhalation every twelve (12) hours.  albuterol (ACCUNEB) 1.25 mg/3 mL nebu 1.25 mg by Nebulization route every six (6) hours as needed.  isosorbide mononitrate ER (IMDUR) 30 mg tablet Take 1 Tab by mouth daily. 30 Tab    capecitabine (XELODA) 500 mg tablet TK 4 TABLETS PO BID FOR 14 DAYS     omeprazole (PRILOSEC) 40 mg capsule Take  by mouth daily.  denosumab (XGEVA) soln injection 120 mg by SubCUTAneous route every thirty (30) days.  pyridoxine (VITAMIN B-6) 100 mg tablet Take 100 mg by mouth daily.  calcium-vitamin D (OYSTER SHELL) 500 mg(1,250mg) -200 unit per tablet Take 1 Tab by mouth daily. No current facility-administered medications for this visit. Family History of CAD:    Yes    Social History:  Current  Smoker  No    Physical Exam:       Visit Vitals    LMP 01/28/2000         Gen: Well-developed, well-nourished, in no acute distress  Neck: Supple,No JVD, No Carotid Bruit,   Resp: No accessory muscle use, Clear breath sounds, No rales or rhonchi  Card: Regular Rate,Rythm,Normal S1, S2, No murmurs, rubs or gallop. No thrills.    Abd:  Soft, non-tender, non-distended,BS+,   MSK: No cyanosis  Skin: No rashes    Neuro: moving all four extremities , follows commands appropriately  Psych:  Good insight, oriented to person, place , alert, Nml Affect  LE: No edema    EKG:       LABS:        Lab Results   Component Value Date/Time    WBC 4.0 03/16/2017 09:41 AM    HGB 11.7 03/16/2017 09:41 AM    HCT 36.8 03/16/2017 09:41 AM    PLATELET 392 96/48/2095 09:41 AM     Lab Results   Component Value Date/Time    Sodium 140 03/16/2017 09:41 AM    Potassium 3.5 03/16/2017 09:41 AM    Chloride 104 03/16/2017 09:41 AM    CO2 25 03/16/2017 09:41 AM Anion gap 11 03/16/2017 09:41 AM    Glucose 101 03/16/2017 09:41 AM    BUN 8 03/16/2017 09:41 AM    Creatinine 0.87 03/16/2017 09:41 AM    BUN/Creatinine ratio 9 03/16/2017 09:41 AM    GFR est AA >60 03/16/2017 09:41 AM    GFR est non-AA >60 03/16/2017 09:41 AM    Calcium 8.8 03/16/2017 09:41 AM       Lab Results   Component Value Date/Time    aPTT 30.6 10/04/2016 03:21 PM     Lab Results   Component Value Date/Time    INR 1.0 01/23/2017 02:54 PM    INR 1.1 10/04/2016 03:21 PM    INR 0.9 02/11/2016 09:00 AM    Prothrombin time 10.0 01/23/2017 02:54 PM    Prothrombin time 10.5 10/04/2016 03:21 PM    Prothrombin time 9.5 02/11/2016 09:00 AM     No components found for: Elvis Wooten MD

## 2017-06-19 NOTE — MR AVS SNAPSHOT
Visit Information Date & Time Provider Department Dept. Phone Encounter #  
 6/19/2017  4:00 PM Jessica Schwarz MD [facility] [place] [phone] [id number] Your Appointments 12/18/2017 10:00 AM  
ESTABLISHED PATIENT with Jessica Schwarz MD  
[facility] ([facility]) Appt Note: 6 mo fup  
 [address] Upcoming Health Maintenance Date Due DTaP/Tdap/Td series (1 - Tdap) 12/16/1967 ZOSTER VACCINE AGE 60> 12/16/2006 GLAUCOMA SCREENING Q2Y 5/1/2016 MEDICARE YEARLY EXAM 10/6/2016 INFLUENZA AGE 9 TO ADULT 8/1/2017 BREAST CANCER SCRN MAMMOGRAM 2/1/2018 COLONOSCOPY 9/15/2026 Allergies as of 6/19/2017  Review Complete On: 6/19/2017 By: Kristie Pickett LPN Severity Noted Reaction Type Reactions Afinitor [Everolimus]  01/23/2017    Other (comments) \"inflammation in my lungs\" Current Immunizations  Reviewed on 10/6/2015 Name Date Influenza High Dose Vaccine PF 10/24/2016 Influenza Vaccine 11/24/2014 Influenza Vaccine Split 10/13/2011 Pneumococcal Conjugate (PCV-13) 10/6/2015 Pneumococcal Vaccine (Unspecified Type) 10/1/2013 Not reviewed this visit Vitals BP Pulse Resp Height(growth percentile) Weight(growth percentile) LMP  
 108/80 (BP 1 Location: Left arm, BP Patient Position: Sitting) 100 22 5' 5\" (1.651 m) 235 lb (106.6 kg) 01/28/2000 SpO2 BMI OB Status Smoking Status 98% 39.11 kg/m2 Postmenopausal Never Smoker Vitals History BMI and BSA Data Body Mass Index Body Surface Area  
 39.11 kg/m 2 2.21 m 2 Preferred Pharmacy Pharmacy Name [id number] - Iesha Mariee, [address] [place] [address] [phone] Your Updated Medication List  
  
   
 This list is accurate as of: 6/19/17  4:08 PM.  Always use your most recent med list.  
  
  
  
  
 atorvastatin 10 mg tablet Commonly known as:  LIPITOR  
TAKE 1 TAB BY MOUTH DAILY. calcium-vitamin D 500 mg(1,250mg) -200 unit per tablet Commonly known as:  OYSTER SHELL Take 1 Tab by mouth daily. capecitabine 500 mg tablet Commonly known as:  XELODA TK 4 TABLETS PO BID FOR 14 DAYS  
  
 carvedilol 3.125 mg tablet Commonly known as:  COREG  
TAKE 1 TABLET BY MOUTH TWICE A DAY WITH MEALS  
  
 denosumab Soln injection Commonly known as:  XGEVA  
120 mg by SubCUTAneous route every thirty (30) days. omeprazole 40 mg capsule Commonly known as:  PRILOSEC Take  by mouth daily. VITAMIN B-6 100 mg tablet Generic drug:  pyridoxine (vitamin B6) Take 100 mg by mouth daily. Introducing [facility]! Dear [employer]: Thank you for requesting a [employer] account. Our records indicate that you already have an active [employer] account. You can access your account anytime at [email or web address] Did you know that you can access your hospital and ER discharge instructions at any time in [employer]? You can also review all of your test results from your hospital stay or ER visit. Additional Information If you have questions, please visit the Frequently Asked Questions section of the [employer] website at [email or web address] Remember, [employer] is NOT to be used for urgent needs. For medical emergencies, dial 911. Now available from your iPhone and Android! Please provide this summary of care documentation to your next provider. Your primary care clinician is listed as Lawrence Nash. If you have any questions after today's visit, please call [phone].

## 2017-07-21 ENCOUNTER — HOSPITAL ENCOUNTER (OUTPATIENT)
Dept: CT IMAGING | Age: 72
Discharge: HOME | End: 2017-07-21
Attending: INTERNAL MEDICINE
Payer: MEDICARE

## 2017-07-21 DIAGNOSIS — C50.412 MALIGNANT NEOPLASM OF UPPER-OUTER QUADRANT OF LEFT FEMALE BREAST (HCC): ICD-10-CM

## 2017-07-21 LAB — CREAT BLD-MCNC: 0.7 MG/DL (ref 0.6–1.3)

## 2017-07-21 PROCEDURE — 82565 ASSAY OF CREATININE: CPT

## 2017-07-21 PROCEDURE — 74011636320 HC RX REV CODE- 636/320: Performed by: INTERNAL MEDICINE

## 2017-07-21 PROCEDURE — 74177 CT ABD & PELVIS W/CONTRAST: CPT

## 2017-07-21 RX ADMIN — IOPAMIDOL 100 ML: 755 INJECTION, SOLUTION INTRAVENOUS at 09:39

## 2017-09-18 RX ORDER — ATORVASTATIN CALCIUM 10 MG/1
TABLET, FILM COATED ORAL
Qty: 30 TAB | Refills: 3 | Status: SHIPPED | OUTPATIENT
Start: 2017-09-18 | End: 2018-01-26 | Stop reason: SDUPTHER

## 2017-09-18 RX ORDER — CARVEDILOL 3.12 MG/1
TABLET ORAL
Qty: 60 TAB | Refills: 3 | Status: SHIPPED | OUTPATIENT
Start: 2017-09-18 | End: 2018-01-26 | Stop reason: SDUPTHER

## 2017-09-18 NOTE — TELEPHONE ENCOUNTER
Requested Prescriptions     Pending Prescriptions Disp Refills    carvedilol (COREG) 3.125 mg tablet 60 Tab 3    atorvastatin (LIPITOR) 10 mg tablet 30 Tab 3     Pharmacy verified    Thank you, AP

## 2017-10-16 ENCOUNTER — HOSPITAL ENCOUNTER (OUTPATIENT)
Dept: CT IMAGING | Age: 72
Discharge: HOME | End: 2017-10-16
Attending: INTERNAL MEDICINE
Payer: MEDICARE

## 2017-10-16 DIAGNOSIS — C50.412 MALIGNANT NEOPLASM OF UPPER-OUTER QUADRANT OF LEFT FEMALE BREAST (HCC): ICD-10-CM

## 2017-10-16 PROCEDURE — 74011636320 HC RX REV CODE- 636/320: Performed by: INTERNAL MEDICINE

## 2017-10-16 PROCEDURE — 74177 CT ABD & PELVIS W/CONTRAST: CPT

## 2017-10-16 RX ADMIN — IOPAMIDOL 100 ML: 755 INJECTION, SOLUTION INTRAVENOUS at 09:12

## 2017-12-18 ENCOUNTER — OFFICE VISIT (OUTPATIENT)
Dept: CARDIOLOGY CLINIC | Age: 72
End: 2017-12-18

## 2017-12-18 VITALS
DIASTOLIC BLOOD PRESSURE: 72 MMHG | OXYGEN SATURATION: 99 % | SYSTOLIC BLOOD PRESSURE: 100 MMHG | BODY MASS INDEX: 35.65 KG/M2 | HEIGHT: 65 IN | HEART RATE: 99 BPM | WEIGHT: 214 LBS

## 2017-12-18 DIAGNOSIS — R05.3 CHRONIC COUGH: ICD-10-CM

## 2017-12-18 DIAGNOSIS — C50.919 MALIGNANT NEOPLASM OF BREAST, STAGE 3, UNSPECIFIED LATERALITY (HCC): ICD-10-CM

## 2017-12-18 DIAGNOSIS — R06.00 DYSPNEA, UNSPECIFIED TYPE: Primary | ICD-10-CM

## 2017-12-18 RX ORDER — MONTELUKAST SODIUM 10 MG/1
10 TABLET ORAL DAILY
COMMUNITY

## 2017-12-18 NOTE — PROGRESS NOTES
Tracie Moreno MD    [address] [facility] [place], [address] [facility]    Office [phone],U [phone]  Pager [phone]    Lawrence Aguila is a 71 y.o. female is here for f/u visit. Primary care physician:  Leia Garcia MD    Patient Active Problem List   Diagnosis Code    Breast cancer, stage 3 ([facility] [address] C50.919    Pneumonia J18.9    SIRS (systemic inflammatory response syndrome) (HCC) R65.10    Transaminitis R74.0    Lactic acidosis E87.2       Dear Dr. Lynsey Nails,    I had the pleasure of seeing Ms. Lawrence Aguila in the office today. Chief complaint:  No chief complaint on file. Assessment:  Chronic cough mild chronic dyspnea on exertion-patient has been seen by ENT/pulmonary. Attributed to either asthma/GERD. Patient also states that she has a postnasal drainage. Left breast cancer metastasized to bone-patient recently had restarted chemotherapy. She was on Taxol. However she had a reaction to the Taxol and is now taking a different chemotherapy medication. (History of radiation treatment to the chest)  GERD  Asthma      Plan:   Normal coronaries by cath 3/60/17. Echo 6/19/17-normal EF. Will repeat echocardiogram for EF since she has restarted chemotherapy  Clinically patient is euvolemic. Continue Coreg. She is followed by pulmonology. She had pneumonitis from her previous chemotherapy medication. She has also been told that she has asthma and uses inhalers. Aggressive cardiovascular risk factor modification. Follow-up in6 months. Patient understands the plan. All questions were answered to the patient's satisfaction. Medication Side Effects and Warnings were discussed with patient: yes  Patient Labs were reviewed and or requested:  yes  Patient Past Records were reviewed and or requested: yes    I appreciate the opportunity to be involved in Ms. Love. See note below for details.  Please do not hesitate to contact us with questions or concerns. Eleazar Kerr MD    Cardiac Testing/ Procedures: A. Cardiac Cath/PCI: 3/16/17 Tortuous Coronaries    L Main: Nml    LAD: Med to Large; MLI; D1 - MLI    LCflex: Large; MLI; OM1 - MLI    RCA: Dominant Med to large; MLI    LVEDP: WNL    LVEF: 45%/ Apical ballooning - c/w Takotsubo's CMP    No significant gradient across aortic valve. Specimens Removed : None    B.ECHO/ZAID:  ECHO 6/19/17 EF 55%    2/6/11 Left ventricle: Systolic function was normal. Ejection fraction was  estimated in the range of 55 % to 60 %. There were no regional wall motion  abnormalities. 3/30/11 - Left ventricle: Systolic function was normal. Ejection fraction was  estimated in the range of 55 % to 60 %. There were no regional wall motion  abnormalities. C.StressNuclear/Stress ECHO/Stress test:    D.Vascular:    E. EP:    F. Miscellaneous:    Subjective:  Paresh Stone is a 71 y.o. female who returns for follow up visit. Patient continues to have mild dyspnea with exertion/chronic cough. No chest pain. No dizziness. History of left breast cancer metastasized to bone. Recently was told that she also had  \"spots in the liver\". Patient recently had restarted chemotherapy. She was on Taxol. However she had a reaction to the Taxol and is now taking a different chemotherapy medication.    (1/31/17  Initial visit- Patient is a pleasant 78-year-old  female who has a history of breast cancer status post left lumpectomy with radiation treatment to the chest twice. She has been on chemotherapy. She states that her breast cancer has metastasized to the bone-spine/hips/sternum/ribs. She was on a medication for her cancer recently which caused her to have pneumonitis. She is off that medication. She has seen  (pulmonologist) who has treated the pneumonitis with steroids. She developed GERD because of the steroids and is now on a PPI.  On 1/23/17 she came to the emergency room because she had an episode of diaphoresis, epigastric pain, dizziness when she stood up. CT scan was negative for PE. Troponin was negative. EKG showed sinus tachycardia.)      ROS:  (bold if positive, if negative)             Medications before admission:    Current Outpatient Prescriptions   Medication Sig Dispense    montelukast (SINGULAIR) 10 mg tablet Take 10 mg by mouth daily.  carvedilol (COREG) 3.125 mg tablet TAKE 1 TABLET BY MOUTH TWICE A DAY WITH MEALS 60 Tab    atorvastatin (LIPITOR) 10 mg tablet TAKE 1 TAB BY MOUTH DAILY. 30 Tab    omeprazole (PRILOSEC) 40 mg capsule Take  by mouth daily.  denosumab (XGEVA) soln injection 120 mg by SubCUTAneous route every thirty (30) days.  pyridoxine (VITAMIN B-6) 100 mg tablet Take 100 mg by mouth daily.  calcium-vitamin D (OYSTER SHELL) 500 mg(1,250mg) -200 unit per tablet Take 1 Tab by mouth daily. No current facility-administered medications for this visit. Family History of CAD:    Yes    Social History:  Current  Smoker  No    Physical Exam:       Visit Vitals    /72 (BP 1 Location: Right arm, BP Patient Position: Sitting)    Pulse 99    Ht 5' 5\" (1.651 m)    Wt 214 lb (97.1 kg)    LMP 01/28/2000    SpO2 99%    BMI 35.61 kg/m2         Gen: Well-developed, well-nourished, in no acute distress  Neck: Supple,No JVD, No Carotid Bruit,   Resp: No accessory muscle use, Clear breath sounds, No rales or rhonchi  Card: Regular Rate,Rythm,Normal S1, S2, No murmurs, rubs or gallop. No thrills.    Abd:  Soft, non-tender, non-distended,BS+,   MSK: No cyanosis  Skin: No rashes    Neuro: moving all four extremities , follows commands appropriately  Psych:  Good insight, oriented to person, place , alert, Nml Affect  LE: No edema    EKG:       LABS:        Lab Results   Component Value Date/Time    WBC 4.0 03/16/2017 09:41 AM    HGB 11.7 03/16/2017 09:41 AM    HCT 36.8 03/16/2017 09:41 AM    PLATELET 679 17/53/6658 09:41 AM     Lab Results Component Value Date/Time    Sodium 140 03/16/2017 09:41 AM    Potassium 3.5 03/16/2017 09:41 AM    Chloride 104 03/16/2017 09:41 AM    CO2 25 03/16/2017 09:41 AM    Anion gap 11 03/16/2017 09:41 AM    Glucose 101 03/16/2017 09:41 AM    BUN 8 03/16/2017 09:41 AM    Creatinine 0.87 03/16/2017 09:41 AM    BUN/Creatinine ratio 9 03/16/2017 09:41 AM    GFR est AA >60 03/16/2017 09:41 AM    GFR est non-AA >60 03/16/2017 09:41 AM    Calcium 8.8 03/16/2017 09:41 AM       Lab Results   Component Value Date/Time    aPTT 30.6 10/04/2016 03:21 PM     Lab Results   Component Value Date/Time    INR 1.0 01/23/2017 02:54 PM    INR 1.1 10/04/2016 03:21 PM    INR 0.9 02/11/2016 09:00 AM    Prothrombin time 10.0 01/23/2017 02:54 PM    Prothrombin time 10.5 10/04/2016 03:21 PM    Prothrombin time 9.5 02/11/2016 09:00 AM     No components found for: Ananth Rothman MD

## 2017-12-18 NOTE — MR AVS SNAPSHOT
Visit Information Date & Time Provider Department Dept. Phone Encounter #  
 12/18/2017 10:00 AM Kimberly Asencio MD [facility] Kassi Robertson [phone] [id number] Upcoming Health Maintenance Date Due DTaP/Tdap/Td series (1 - Tdap) 12/16/1967 ZOSTER VACCINE AGE 60> 10/16/2006 GLAUCOMA SCREENING Q2Y 5/1/2016 MEDICARE YEARLY EXAM 10/6/2016 Influenza Age 5 to Adult 8/1/2017 COLONOSCOPY 9/15/2026 Allergies as of 12/18/2017  Review Complete On: 12/18/2017 By: Anali Stephens  
  
 Severity Noted Reaction Type Reactions Afinitor [Everolimus]  01/23/2017    Other (comments) \"inflammation in my lungs\" Current Immunizations  Reviewed on 10/6/2015 Name Date Influenza High Dose Vaccine PF 10/24/2016 Influenza Vaccine 11/24/2014 Influenza Vaccine Split 10/13/2011 Pneumococcal Conjugate (PCV-13) 10/6/2015 Pneumococcal Vaccine (Unspecified Type) 10/1/2013 Not reviewed this visit Vitals BP Pulse Height(growth percentile) Weight(growth percentile) LMP SpO2  
 100/72 (BP 1 Location: Right arm, BP Patient Position: Sitting) 99 5' 5\" (1.651 m) 214 lb (97.1 kg) 01/28/2000 99% BMI OB Status Smoking Status 35.61 kg/m2 Postmenopausal Never Smoker Vitals History BMI and BSA Data Body Mass Index Body Surface Area  
 35.61 kg/m 2 2.11 m 2 Preferred Pharmacy Pharmacy Name Phone [id number] - [place], [address] [place] [address] [phone] Your Updated Medication List  
  
   
This list is accurate as of: 12/18/17 11:15 AM.  Always use your most recent med list.  
  
  
  
  
 atorvastatin 10 mg tablet Commonly known as:  LIPITOR  
TAKE 1 TAB BY MOUTH DAILY. calcium-vitamin D 500 mg(1,250mg) -200 unit per tablet Commonly known as:  OYSTER SHELL Take 1 Tab by mouth daily. carvedilol 3.125 mg tablet Commonly known as:  Calderon Darby  
 TAKE 1 TABLET BY MOUTH TWICE A DAY WITH MEALS  
  
 denosumab Soln injection Commonly known as:  XGEVA  
120 mg by SubCUTAneous route every thirty (30) days. omeprazole 40 mg capsule Commonly known as:  PRILOSEC Take  by mouth daily. SINGULAIR 10 mg tablet Generic drug:  montelukast  
Take 10 mg by mouth daily. VITAMIN B-6 100 mg tablet Generic drug:  pyridoxine (vitamin B6) Take 100 mg by mouth daily. Introducing [facility] SERVICES! Dear Romi Palm: Thank you for requesting a trueAnthem account. Our records indicate that you already have an active trueAnthem account. You can access your account anytime at [email or web address] Did you know that you can access your hospital and ER discharge instructions at any time in trueAnthem? You can also review all of your test results from your hospital stay or ER visit. Additional Information If you have questions, please visit the Frequently Asked Questions section of the trueAnthem website at [email or web address] Remember, trueAnthem is NOT to be used for urgent needs. For medical emergencies, dial 911. Now available from your iPhone and Android! Please provide this summary of care documentation to your next provider. Your primary care clinician is listed as Patti Maurice. If you have any questions after today's visit, please call [phone].

## 2017-12-22 ENCOUNTER — HOSPITAL ENCOUNTER (EMERGENCY)
Age: 72
Discharge: HOME | End: 2017-12-22
Attending: EMERGENCY MEDICINE
Payer: MEDICARE

## 2017-12-22 ENCOUNTER — APPOINTMENT (OUTPATIENT)
Dept: CT IMAGING | Age: 72
End: 2017-12-22
Attending: EMERGENCY MEDICINE
Payer: MEDICARE

## 2017-12-22 ENCOUNTER — CLINICAL SUPPORT (OUTPATIENT)
Dept: CARDIOLOGY CLINIC | Age: 72
End: 2017-12-22

## 2017-12-22 VITALS
RESPIRATION RATE: 22 BRPM | BODY MASS INDEX: 35.65 KG/M2 | HEIGHT: 65 IN | HEART RATE: 120 BPM | DIASTOLIC BLOOD PRESSURE: 58 MMHG | OXYGEN SATURATION: 96 % | WEIGHT: 214 LBS | TEMPERATURE: 98.7 F | SYSTOLIC BLOOD PRESSURE: 110 MMHG

## 2017-12-22 DIAGNOSIS — R06.02 SOB (SHORTNESS OF BREATH): ICD-10-CM

## 2017-12-22 DIAGNOSIS — R07.89 CHEST WALL PAIN: ICD-10-CM

## 2017-12-22 DIAGNOSIS — M25.511 ACUTE PAIN OF RIGHT SHOULDER: Primary | ICD-10-CM

## 2017-12-22 DIAGNOSIS — R65.10 SIRS (SYSTEMIC INFLAMMATORY RESPONSE SYNDROME) (HCC): ICD-10-CM

## 2017-12-22 DIAGNOSIS — C50.919 MALIGNANT NEOPLASM OF BREAST, STAGE 3, UNSPECIFIED LATERALITY (HCC): Primary | ICD-10-CM

## 2017-12-22 LAB
ALBUMIN SERPL-MCNC: 3.3 G/DL (ref 3.5–5)
ALBUMIN/GLOB SERPL: 0.8 {RATIO} (ref 1.1–2.2)
ALP SERPL-CCNC: 141 U/L (ref 45–117)
ALT SERPL-CCNC: 89 U/L (ref 12–78)
ANION GAP BLD CALC-SCNC: 17 MMOL/L (ref 5–15)
AST SERPL-CCNC: 64 U/L (ref 15–37)
BASOPHILS # BLD: 0 K/UL (ref 0–0.1)
BASOPHILS NFR BLD: 0 % (ref 0–1)
BILIRUB DIRECT SERPL-MCNC: 0.1 MG/DL (ref 0–0.2)
BILIRUB SERPL-MCNC: 0.5 MG/DL (ref 0.2–1)
BUN BLD-MCNC: 12 MG/DL (ref 9–20)
CA-I BLD-MCNC: 1.17 MMOL/L (ref 1.12–1.32)
CHLORIDE BLD-SCNC: 104 MMOL/L (ref 98–107)
CK MB CFR SERPL CALC: NORMAL % (ref 0–2.5)
CK MB SERPL-MCNC: <1 NG/ML (ref 5–25)
CK SERPL-CCNC: 119 U/L (ref 26–192)
CO2 BLD-SCNC: 24 MMOL/L (ref 21–32)
CREAT BLD-MCNC: 0.7 MG/DL (ref 0.6–1.3)
EOSINOPHIL # BLD: 0 K/UL (ref 0–0.4)
EOSINOPHIL NFR BLD: 0 % (ref 0–7)
ERYTHROCYTE [DISTWIDTH] IN BLOOD BY AUTOMATED COUNT: 16.9 % (ref 11.5–14.5)
GLOBULIN SER CALC-MCNC: 4.1 G/DL (ref 2–4)
GLUCOSE BLD-MCNC: 111 MG/DL (ref 65–100)
HCT VFR BLD AUTO: 35.7 % (ref 35–47)
HCT VFR BLD CALC: 34 % (ref 35–47)
HGB BLD-MCNC: 11.6 G/DL (ref 11.5–16)
HGB BLD-MCNC: 11.6 GM/DL (ref 11.5–16)
LIPASE SERPL-CCNC: 61 U/L (ref 73–393)
LYMPHOCYTES # BLD: 0.8 K/UL (ref 0.8–3.5)
LYMPHOCYTES NFR BLD: 9 % (ref 12–49)
MCH RBC QN AUTO: 31.5 PG (ref 26–34)
MCHC RBC AUTO-ENTMCNC: 32.5 G/DL (ref 30–36.5)
MCV RBC AUTO: 97 FL (ref 80–99)
MONOCYTES # BLD: 0.9 K/UL (ref 0–1)
MONOCYTES NFR BLD: 10 % (ref 5–13)
NEUTS SEG # BLD: 7.8 K/UL (ref 1.8–8)
NEUTS SEG NFR BLD: 81 % (ref 32–75)
PLATELET # BLD AUTO: 364 K/UL (ref 150–400)
POTASSIUM BLD-SCNC: 3.4 MMOL/L (ref 3.5–5.1)
PROT SERPL-MCNC: 7.4 G/DL (ref 6.4–8.2)
RBC # BLD AUTO: 3.68 M/UL (ref 3.8–5.2)
SERVICE CMNT-IMP: ABNORMAL
SODIUM BLD-SCNC: 141 MMOL/L (ref 136–145)
TROPONIN I SERPL-MCNC: <0.04 NG/ML
TROPONIN I SERPL-MCNC: <0.04 NG/ML
WBC # BLD AUTO: 9.6 K/UL (ref 3.6–11)

## 2017-12-22 PROCEDURE — 74011250636 HC RX REV CODE- 250/636: Performed by: EMERGENCY MEDICINE

## 2017-12-22 PROCEDURE — 93005 ELECTROCARDIOGRAM TRACING: CPT

## 2017-12-22 PROCEDURE — 36415 COLL VENOUS BLD VENIPUNCTURE: CPT | Performed by: EMERGENCY MEDICINE

## 2017-12-22 PROCEDURE — 96360 HYDRATION IV INFUSION INIT: CPT

## 2017-12-22 PROCEDURE — 99285 EMERGENCY DEPT VISIT HI MDM: CPT

## 2017-12-22 PROCEDURE — 94762 N-INVAS EAR/PLS OXIMTRY CONT: CPT

## 2017-12-22 PROCEDURE — 80047 BASIC METABLC PNL IONIZED CA: CPT

## 2017-12-22 PROCEDURE — 74011636320 HC RX REV CODE- 636/320: Performed by: RADIOLOGY

## 2017-12-22 PROCEDURE — 71275 CT ANGIOGRAPHY CHEST: CPT

## 2017-12-22 PROCEDURE — 84484 ASSAY OF TROPONIN QUANT: CPT | Performed by: EMERGENCY MEDICINE

## 2017-12-22 PROCEDURE — 80076 HEPATIC FUNCTION PANEL: CPT | Performed by: EMERGENCY MEDICINE

## 2017-12-22 PROCEDURE — 82550 ASSAY OF CK (CPK): CPT | Performed by: EMERGENCY MEDICINE

## 2017-12-22 PROCEDURE — 83690 ASSAY OF LIPASE: CPT | Performed by: EMERGENCY MEDICINE

## 2017-12-22 PROCEDURE — 85025 COMPLETE CBC W/AUTO DIFF WBC: CPT | Performed by: EMERGENCY MEDICINE

## 2017-12-22 RX ORDER — LORATADINE 10 MG/1
10 TABLET ORAL DAILY
COMMUNITY
End: 2019-01-11 | Stop reason: ALTCHOICE

## 2017-12-22 RX ADMIN — SODIUM CHLORIDE 1000 ML: 900 INJECTION, SOLUTION INTRAVENOUS at 16:45

## 2017-12-22 RX ADMIN — IOPAMIDOL 75 ML: 755 INJECTION, SOLUTION INTRAVENOUS at 16:26

## 2017-12-22 NOTE — ED PROVIDER NOTES
HPI Comments: 71 y.o.  female with past medical history significant for left breast CA with mets to bone s/p: lumpectomy, port cath insertion, appendectomy, and colonoscopy who presents from home with chief complaint of chest wall pain. Pt complains of right chest wall pain dull and aching in quality, located in the area around her port cath, acute onset this afternoon (1.5 hours PTA) as she rode an electric cart through [place]. Pt reports chronic GILES, nasal congestion, and cough since reaction to medication 1 year ago, with no acute changes noted. Pt was seen by Dr. Cuco Nelson for echo today at 11:00, normal results. Pt reports chemotherapy yesterday with no complications. Pt denies any medication to mange pain PTA. Pt reports almost fully resolved pain in the ED, current rate: 1/10. Pt denies any fever, v/d, diaphoresis, or abdominal pain. There are no other acute medical concerns at this time. Social hx: No tobacco use; No illicit drug use; No EtOH use. PCP: Lisa Reddy MD  Oncologist: iMnnie Harris MD    Note written by Rory. Gayatri Duke, as dictated by Sera Henry MD 3:52 PM      The history is provided by the patient. No  was used.         Past Medical History:   Diagnosis Date    Cancer [facility]) 2011    LEFT breast cancer    Ill-defined condition     Breast CA mets bone 2/2016       Past Surgical History:   Procedure Laterality Date    COLONOSCOPY N/A 9/15/2016    COLONOSCOPY performed by Tere Landaverde MD at [facility] HX APPENDECTOMY      HX BREAST LUMPECTOMY      LEFT breast    HX OTHER SURGICAL  3/2011    portacath insertion         Family History:   Problem Relation Age of Onset    Cancer Mother      colon    Heart Disease Father     Cancer Father     Diabetes Sister        Social History     Social History    Marital status:      Spouse name: N/A    Number of children: N/A    Years of education: N/A     Occupational History  Not on file. Social History Main Topics    Smoking status: Never Smoker    Smokeless tobacco: Never Used    Alcohol use No    Drug use: No    Sexual activity: Yes     Partners: Male     Other Topics Concern    Not on file     Social History Narrative         ALLERGIES: Afinitor [everolimus]    Review of Systems   Constitutional: Negative for fever. Gastrointestinal: Negative for abdominal pain, nausea and vomiting. Musculoskeletal:        Chest wall pain (right). All other systems reviewed and are negative. Vitals:    12/22/17 1519   BP: 149/83   Pulse: (!) 126   Resp: 26   Temp: 98.7 °F (37.1 °C)   SpO2: 98%   Weight: 97.1 kg (214 lb)   Height: 5' 5\" (1.651 m)            Physical Exam   Constitutional: She is oriented to person, place, and time. She appears well-developed and well-nourished. NAD, AxOx4, speaking in complete sentences     HENT:   Cn intact     Eyes: Conjunctivae and EOM are normal. Pupils are equal, round, and reactive to light. Right eye exhibits no discharge. Left eye exhibits no discharge. No scleral icterus. Neck: Normal range of motion. Neck supple. No JVD present. No tracheal deviation present. Cardiovascular: Normal rate, regular rhythm, normal heart sounds and intact distal pulses. Exam reveals no gallop and no friction rub. No murmur heard. Pulmonary/Chest: Effort normal and breath sounds normal. No respiratory distress. She has no wheezes. She has no rales. She exhibits no tenderness. R ant CW - noted port/ nttp; Abdominal: Soft. Bowel sounds are normal. There is no tenderness. There is no rebound and no guarding. Genitourinary: No vaginal discharge found. Genitourinary Comments: Pt denies urinary/ vaginal complaints   Musculoskeletal: Normal range of motion. She exhibits no edema or tenderness. Neurological: She is alert and oriented to person, place, and time. She has normal reflexes. No cranial nerve deficit.  She exhibits normal muscle tone. Coordination normal.   pt has motor/ CV/ Sensation grossly intact to all extremities, R = L in strength;   Skin: Skin is warm and dry. No rash noted. No erythema. No pallor. Psychiatric: She has a normal mood and affect. Her behavior is normal. Thought content normal.   Nursing note and vitals reviewed. [facility]  ED Course       Procedures    Chief Complaint   Patient presents with    Shoulder Pain       4:59 PM  The patients presenting problems have been discussed, and they are in agreement with the care plan formulated and outlined with them. I have encouraged them to ask questions as they arise throughout their visit. MEDICATIONS GIVEN:  Medications   sodium chloride 0.9 % bolus infusion 1,000 mL (1,000 mL IntraVENous New Bag 12/22/17 2710)   iopamidol (ISOVUE-370) 76 % injection 100 mL (not administered)       LABS REVIEWED:  Labs Reviewed   CBC WITH AUTOMATED DIFF - Abnormal; Notable for the following:        Result Value    RBC 3.68 (*)     RDW 16.9 (*)     NEUTROPHILS 81 (*)     LYMPHOCYTES 9 (*)     All other components within normal limits   POC CHEM8 - Abnormal; Notable for the following:     Potassium (POC) 3.4 (*)     Anion gap (POC) 17 (*)     Glucose (POC) 111 (*)     Hematocrit (POC) 34 (*)     All other components within normal limits   TROPONIN I   LIPASE   CK W/ CKMB & INDEX   HEPATIC FUNCTION PANEL   POC CHEM8       RADIOLOGY RESULTS:  The following have been ordered and reviewed:  _____________________________________________________________________  _____________________________________________________________________    EKG interpretation: (Preliminary)  Rhythm:  sinus tachycardia rhythm; and regular . Rate (approx.): 126; Axis: normal; P wave: normal; QRS interval: normal ; ST/T wave: normal; Negative acute significant segmental elevations/ unchanged compared to study dated 01/23/2017    PROCEDURES:        CONSULTATIONS:       PROGRESS NOTES:      DIAGNOSIS:    1.  Acute pain of right shoulder    2. Chest wall pain        PLAN:  1-Chest Pain / neg ed evaluation - will have pt follow-up with PCP/ Cardiology;       ED COURSE: The patients hospital course has been uncomplicated. 5:31 PM  Pt 'doing fine'; told of results; / agrees w/ repeat enzymes at 7:45 pm/     8:27 PM  Gayle Arita's  results have been reviewed with her. She has been counseled regarding her diagnosis. She verbally conveys understanding and agreement of the signs, symptoms, diagnosis, treatment and prognosis and additionally agrees to Call/ Arrange follow up as recommended with Dr. Sari Matias MD in 24 - 48 hours. She also agrees with the care-plan and conveys that all of her questions have been answered. I have also put together some discharge instructions for her that include: 1) educational information regarding their diagnosis, 2) how to care for their diagnosis at home, as well a 3) list of reasons why they would want to return to the ED prior to their follow-up appointment, should their condition change or for concerns.

## 2017-12-22 NOTE — ED TRIAGE NOTES
Chemo pt here with intermittent right shoulder and arm pain. Pt appears SOB but reports it is no different than her norm.

## 2017-12-22 NOTE — Clinical Note
Thank you for allowing us to provide you with medical care today. We realize that you have many choices for your emergency care needs. We thank you for choosing [facility]. Please choose us in the future for any continued health care needs. We hope we addressed all of your medical concerns. We strive to provide excellent quality care in the Emergency Department. Anything less than excellent does not meet our expectations. The exam and treatment you received in the Emergency Department  were for an emergent problem and are not intended as complete care. It is important that you follow up with a doctor, nurse practitioner, or [address] [place] [address] assistant for ongoing care. If your symptoms worsen or you do not improve as expected and you are un able to reach your usual health care provider, you should return to the Emergency Department. We are available 24 hours a day. Take this sheet with you when you go to your follow-up visit. If you have any problem arranging the follow-up visit, co ntact the Emergency Department immediately. Make an appointment your family doctor for follow up of this visit. Return to the ER if you are unable to be seen in a timely manner.

## 2017-12-22 NOTE — DISCHARGE INSTRUCTIONS
Arm Pain in Children: Care Instructions  Your Care Instructions    Your child can hurt his or her arm by using it too much or by injuring it. Biking and wrestling are examples of activities that can lead to arm pain. Everyday wear and tear, especially as your child gets older, can cause arm pain. Your child's forearms, wrists, hands, and fingers are the parts of the arm that are most likely to become painful. A minor arm injury usually will heal on its own with home treatment to relieve swelling and pain. If your child has a more serious injury, he or she may need tests and treatment. Follow-up care is a key part of your child's treatment and safety. Be sure to make and go to all appointments, and call your doctor if your child is having problems. It's also a good idea to know your child's test results and keep a list of the medicines your child takes. How can you care for your child at home? · Give pain medicines exactly as directed. ¨ If the doctor gave your child a prescription medicine for pain, give it as prescribed. ¨ If your child is not taking a prescription pain medicine, ask your doctor if your child can take an over-the-counter medicine. · Make sure your child rests and protects the arm. Have your child take a break from any activity that may cause pain. · Put ice or a cold pack on the arm for 10 to 20 minutes at a time. Put a thin cloth between the ice and your child's skin. · Prop up the sore arm on a pillow when icing it or anytime your child sits or lies down during the next 3 days. Try to keep the arm above the level of your child's heart. This will help reduce swelling. · If your doctor recommends a sling to support the arm, make sure your child wears it as directed. When should you call for help? Call your doctor now or seek immediate medical care if:  ? · Your child's arm or hand is cool or pale or changes color. ? · Your child cannot use the arm.    ? · Your child has signs of infection, such as:  ¨ Increased pain, swelling, warmth, or redness. ¨ Red streaks running up or down the arm. ¨ Pus draining from an area of the arm. ¨ A fever. ? · Your child has tingling, weakness, or numbness in the arm. ? Watch closely for changes in your child's health, and be sure to contact your doctor if:  ? · Your child does not get better as expected. Where can you learn more? Go to http://demarco-cathy.info/. Enter [id number] in the search box to learn more about \"Arm Pain in Children: Care Instructions. \"  Current as of: March 20, 2017  Content Version: 11.4  © 6862-3305 [employer]. Care instructions adapted under license by [employer] (which disclaims liability or warranty for this information). If you have questions about a medical condition or this instruction, always ask your healthcare professional. [place] [address] any warranty or liability for your use of this information. We hope that we have addressed all of your medical concerns. The examination and treatment you received in the Emergency Department were for an emergent problem and were not intended as complete care. It is important that you follow up with your healthcare provider(s) for ongoing care. If your symptoms worsen or do not improve as expected, and you are unable to reach your usual health care provider(s), you should return to the Emergency Department. Today's healthcare is undergoing tremendous change, and patient satisfaction surveys are one of the many tools to assess the quality of medical care. You may receive a survey from the [employer] organization regarding your experience in the Emergency Department. I hope that your experience has been completely positive, particularly the medical care that I provided. As such, please participate in the survey; anything less than excellent does not meet my expectations or intentions.         [facility] and [employer] participate in nationally recognized quality of care measures. If your blood pressure is greater than 120/80, as reported below, we urge that you seek medical care to address the potential of high blood pressure, commonly known as hypertension. Hypertension can be hereditary or can be caused by certain medical conditions, pain, stress, or \"white coat syndrome. \"       Please make an appointment with your health care provider(s) for follow up of your Emergency Department visit. VITALS:   Patient Vitals for the past 8 hrs:   Temp Pulse Resp BP SpO2   12/22/17 1654 - (!) 123 22 - 96 %   12/22/17 1649 - (!) 128 21 138/87 96 %   12/22/17 1646 - (!) 128 23 - 97 %   12/22/17 1519 98.7 °F (37.1 °C) (!) 126 26 149/83 98 %          Thank you for allowing us to provide you with medical care today. We realize that you have many choices for your emergency care needs. Please choose us in the future for any continued health care needs. Ariel Grayson [address], [address] [facility] [address].   Office: [phone]            Recent Results (from the past 24 hour(s))   TROPONIN I    Collection Time: 12/22/17  4:41 PM   Result Value Ref Range    Troponin-I, Qt. <0.04 <0.05 ng/mL   LIPASE    Collection Time: 12/22/17  4:41 PM   Result Value Ref Range    Lipase 61 (L) 73 - 393 U/L   CK W/ CKMB & INDEX    Collection Time: 12/22/17  4:41 PM   Result Value Ref Range     26 - 192 U/L    CK - MB <1.0 <3.6 NG/ML    CK-MB Index Cannot be calculated 0 - 2.5     CBC WITH AUTOMATED DIFF    Collection Time: 12/22/17  4:41 PM   Result Value Ref Range    WBC 9.6 3.6 - 11.0 K/uL    RBC 3.68 (L) 3.80 - 5.20 M/uL    HGB 11.6 11.5 - 16.0 g/dL    HCT 35.7 35.0 - 47.0 %    MCV 97.0 80.0 - 99.0 FL    MCH 31.5 26.0 - 34.0 PG    MCHC 32.5 30.0 - 36.5 g/dL    RDW 16.9 (H) 11.5 - 14.5 %    PLATELET 581 470 - 076 K/uL    NEUTROPHILS 81 (H) 32 - 75 % LYMPHOCYTES 9 (L) 12 - 49 %    MONOCYTES 10 5 - 13 %    EOSINOPHILS 0 0 - 7 %    BASOPHILS 0 0 - 1 %    ABS. NEUTROPHILS 7.8 1.8 - 8.0 K/UL    ABS. LYMPHOCYTES 0.8 0.8 - 3.5 K/UL    ABS. MONOCYTES 0.9 0.0 - 1.0 K/UL    ABS. EOSINOPHILS 0.0 0.0 - 0.4 K/UL    ABS. BASOPHILS 0.0 0.0 - 0.1 K/UL   HEPATIC FUNCTION PANEL    Collection Time: 12/22/17  4:41 PM   Result Value Ref Range    Protein, total 7.4 6.4 - 8.2 g/dL    Albumin 3.3 (L) 3.5 - 5.0 g/dL    Globulin 4.1 (H) 2.0 - 4.0 g/dL    A-G Ratio 0.8 (L) 1.1 - 2.2      Bilirubin, total 0.5 0.2 - 1.0 MG/DL    Bilirubin, direct 0.1 0.0 - 0.2 MG/DL    Alk. phosphatase 141 (H) 45 - 117 U/L    AST (SGOT) 64 (H) 15 - 37 U/L    ALT (SGPT) 89 (H) 12 - 78 U/L   POC CHEM8    Collection Time: 12/22/17  4:43 PM   Result Value Ref Range    Calcium, ionized (POC) 1.17 1.12 - 1.32 MMOL/L    Sodium (POC) 141 136 - 145 MMOL/L    Potassium (POC) 3.4 (L) 3.5 - 5.1 MMOL/L    Chloride (POC) 104 98 - 107 MMOL/L    CO2 (POC) 24 21 - 32 MMOL/L    Anion gap (POC) 17 (H) 5 - 15 mmol/L    Glucose (POC) 111 (H) 65 - 100 MG/DL    BUN (POC) 12 9 - 20 MG/DL    Creatinine (POC) 0.7 0.6 - 1.3 MG/DL    GFRAA, POC >60 >60 ml/min/1.73m2    GFRNA, POC >60 >60 ml/min/1.73m2    Hemoglobin (POC) 11.6 11.5 - 16.0 GM/DL    Hematocrit (POC) 34 (L) 35.0 - 47.0 %    Comment Comment Not Indicated. Cta Chest W Or W Wo Cont    Result Date: 12/22/2017  Clinical indication: Right upper chest pain. Localizer obtained without contrast at the left pulmonary arteries. Fast injection rate of 100 cc of Isovue-370 with review of the raw data and MIP reconstructions. Comparison CT chest October 16 217, CT dose reduction was achieved through the use of a standardized protocol tailored for this examination and automatic exposure control for dose modulation . There is no evidence for pulmonary emboli. There is no pericardial pleural effusion no shift or pneumothorax. Lesion in the left lung base are unchanged.  There is no parenchymal mass. IMPRESSION: No acute changes. No pulmonary emboli.

## 2017-12-23 NOTE — ED NOTES
Bedside and Verbal shift change report given to [id number] [facility]  (oncoming nurse) by Xiomara Hernandez RN  (offgoing nurse). Report included the following information SBAR.

## 2017-12-24 LAB
ATRIAL RATE: 127 BPM
CALCULATED P AXIS, ECG09: 36 DEGREES
CALCULATED R AXIS, ECG10: -14 DEGREES
CALCULATED T AXIS, ECG11: 39 DEGREES
DIAGNOSIS, 93000: NORMAL
P-R INTERVAL, ECG05: 134 MS
Q-T INTERVAL, ECG07: 326 MS
QRS DURATION, ECG06: 74 MS
QTC CALCULATION (BEZET), ECG08: 473 MS
VENTRICULAR RATE, ECG03: 127 BPM

## 2018-01-26 RX ORDER — CARVEDILOL 3.12 MG/1
TABLET ORAL
Qty: 60 TAB | Refills: 3 | Status: SHIPPED | OUTPATIENT
Start: 2018-01-26 | End: 2018-06-29 | Stop reason: SDUPTHER

## 2018-01-26 RX ORDER — ATORVASTATIN CALCIUM 10 MG/1
TABLET, FILM COATED ORAL
Qty: 30 TAB | Refills: 3 | Status: SHIPPED | OUTPATIENT
Start: 2018-01-26 | End: 2018-05-31 | Stop reason: SDUPTHER

## 2018-01-26 NOTE — TELEPHONE ENCOUNTER
Requested Prescriptions     Pending Prescriptions Disp Refills    atorvastatin (LIPITOR) 10 mg tablet 30 Tab 3     Sig: TAKE 1 TAB BY MOUTH DAILY.  carvedilol (COREG) 3.125 mg tablet 60 Tab 3     Sig: TAKE 1 TABLET BY MOUTH TWICE A DAY WITH MEALS     Pharmacy Verified ! Thanks !

## 2018-02-08 ENCOUNTER — APPOINTMENT (OUTPATIENT)
Dept: CT IMAGING | Age: 73
End: 2018-02-08
Attending: EMERGENCY MEDICINE
Payer: MEDICARE

## 2018-02-08 ENCOUNTER — PATIENT OUTREACH (OUTPATIENT)
Dept: INTERNAL MEDICINE CLINIC | Age: 73
End: 2018-02-08

## 2018-02-08 ENCOUNTER — HOSPITAL ENCOUNTER (EMERGENCY)
Age: 73
Discharge: HOME | End: 2018-02-08
Attending: EMERGENCY MEDICINE | Admitting: EMERGENCY MEDICINE
Payer: MEDICARE

## 2018-02-08 VITALS
DIASTOLIC BLOOD PRESSURE: 66 MMHG | SYSTOLIC BLOOD PRESSURE: 107 MMHG | WEIGHT: 200 LBS | TEMPERATURE: 98.2 F | HEART RATE: 118 BPM | BODY MASS INDEX: 33.32 KG/M2 | RESPIRATION RATE: 24 BRPM | OXYGEN SATURATION: 98 % | HEIGHT: 65 IN

## 2018-02-08 DIAGNOSIS — N30.00 ACUTE CYSTITIS WITHOUT HEMATURIA: ICD-10-CM

## 2018-02-08 DIAGNOSIS — R06.02 SOB (SHORTNESS OF BREATH): Primary | ICD-10-CM

## 2018-02-08 LAB
ALBUMIN SERPL-MCNC: 2.9 G/DL (ref 3.5–5)
ALBUMIN/GLOB SERPL: 0.8 {RATIO} (ref 1.1–2.2)
ALP SERPL-CCNC: 142 U/L (ref 45–117)
ALT SERPL-CCNC: 97 U/L (ref 12–78)
ANION GAP SERPL CALC-SCNC: 7 MMOL/L (ref 5–15)
APPEARANCE UR: ABNORMAL
AST SERPL-CCNC: 70 U/L (ref 15–37)
ATRIAL RATE: 125 BPM
BACTERIA URNS QL MICRO: ABNORMAL /HPF
BASOPHILS # BLD: 0 K/UL (ref 0–0.1)
BASOPHILS NFR BLD: 1 % (ref 0–1)
BILIRUB SERPL-MCNC: 0.5 MG/DL (ref 0.2–1)
BILIRUB UR QL: NEGATIVE
BNP SERPL-MCNC: 214 PG/ML (ref 0–125)
BUN SERPL-MCNC: 8 MG/DL (ref 6–20)
BUN/CREAT SERPL: 12 (ref 12–20)
CALCIUM SERPL-MCNC: 8.7 MG/DL (ref 8.5–10.1)
CALCULATED P AXIS, ECG09: 47 DEGREES
CALCULATED R AXIS, ECG10: -11 DEGREES
CALCULATED T AXIS, ECG11: 13 DEGREES
CHLORIDE SERPL-SCNC: 104 MMOL/L (ref 97–108)
CO2 SERPL-SCNC: 28 MMOL/L (ref 21–32)
COLOR UR: ABNORMAL
CREAT SERPL-MCNC: 0.67 MG/DL (ref 0.55–1.02)
DIAGNOSIS, 93000: NORMAL
DIFFERENTIAL METHOD BLD: ABNORMAL
EOSINOPHIL # BLD: 0.1 K/UL (ref 0–0.4)
EOSINOPHIL NFR BLD: 3 % (ref 0–7)
EPITH CASTS URNS QL MICRO: ABNORMAL /LPF
ERYTHROCYTE [DISTWIDTH] IN BLOOD BY AUTOMATED COUNT: 18.1 % (ref 11.5–14.5)
GLOBULIN SER CALC-MCNC: 3.7 G/DL (ref 2–4)
GLUCOSE SERPL-MCNC: 101 MG/DL (ref 65–100)
GLUCOSE UR STRIP.AUTO-MCNC: NEGATIVE MG/DL
HCT VFR BLD AUTO: 32.5 % (ref 35–47)
HGB BLD-MCNC: 10.3 G/DL (ref 11.5–16)
HGB UR QL STRIP: ABNORMAL
HYALINE CASTS URNS QL MICRO: ABNORMAL /LPF (ref 0–5)
IMM GRANULOCYTES # BLD: 0 K/UL (ref 0–0.04)
IMM GRANULOCYTES NFR BLD AUTO: 1 % (ref 0–0.5)
KETONES UR QL STRIP.AUTO: NEGATIVE MG/DL
LEUKOCYTE ESTERASE UR QL STRIP.AUTO: ABNORMAL
LYMPHOCYTES # BLD: 0.6 K/UL (ref 0.8–3.5)
LYMPHOCYTES NFR BLD: 15 % (ref 12–49)
MCH RBC QN AUTO: 29.4 PG (ref 26–34)
MCHC RBC AUTO-ENTMCNC: 31.7 G/DL (ref 30–36.5)
MCV RBC AUTO: 92.9 FL (ref 80–99)
MONOCYTES # BLD: 0.9 K/UL (ref 0–1)
MONOCYTES NFR BLD: 21 % (ref 5–13)
NEUTS SEG # BLD: 2.5 K/UL (ref 1.8–8)
NEUTS SEG NFR BLD: 59 % (ref 32–75)
NITRITE UR QL STRIP.AUTO: NEGATIVE
NRBC # BLD: 0 K/UL (ref 0–0.01)
NRBC BLD-RTO: 0 PER 100 WBC
P-R INTERVAL, ECG05: 140 MS
PH UR STRIP: 7.5 [PH] (ref 5–8)
PLATELET # BLD AUTO: 281 K/UL (ref 150–400)
PMV BLD AUTO: 9.5 FL (ref 8.9–12.9)
POTASSIUM SERPL-SCNC: 3.8 MMOL/L (ref 3.5–5.1)
PROT SERPL-MCNC: 6.6 G/DL (ref 6.4–8.2)
PROT UR STRIP-MCNC: ABNORMAL MG/DL
Q-T INTERVAL, ECG07: 332 MS
QRS DURATION, ECG06: 70 MS
QTC CALCULATION (BEZET), ECG08: 479 MS
RBC # BLD AUTO: 3.5 M/UL (ref 3.8–5.2)
RBC #/AREA URNS HPF: ABNORMAL /HPF (ref 0–5)
RBC MORPH BLD: ABNORMAL
RBC MORPH BLD: ABNORMAL
SODIUM SERPL-SCNC: 139 MMOL/L (ref 136–145)
SP GR UR REFRACTOMETRY: 1.02 (ref 1–1.03)
TROPONIN I SERPL-MCNC: <0.04 NG/ML
TROPONIN I SERPL-MCNC: <0.04 NG/ML
UR CULT HOLD, URHOLD: NORMAL
UROBILINOGEN UR QL STRIP.AUTO: 0.2 EU/DL (ref 0.2–1)
VENTRICULAR RATE, ECG03: 125 BPM
WBC # BLD AUTO: 4.1 K/UL (ref 3.6–11)
WBC URNS QL MICRO: ABNORMAL /HPF (ref 0–4)

## 2018-02-08 PROCEDURE — 96365 THER/PROPH/DIAG IV INF INIT: CPT

## 2018-02-08 PROCEDURE — 96361 HYDRATE IV INFUSION ADD-ON: CPT

## 2018-02-08 PROCEDURE — 94640 AIRWAY INHALATION TREATMENT: CPT

## 2018-02-08 PROCEDURE — 81001 URINALYSIS AUTO W/SCOPE: CPT | Performed by: EMERGENCY MEDICINE

## 2018-02-08 PROCEDURE — 74011250636 HC RX REV CODE- 250/636: Performed by: EMERGENCY MEDICINE

## 2018-02-08 PROCEDURE — 99285 EMERGENCY DEPT VISIT HI MDM: CPT

## 2018-02-08 PROCEDURE — 74011000258 HC RX REV CODE- 258: Performed by: EMERGENCY MEDICINE

## 2018-02-08 PROCEDURE — 74011000250 HC RX REV CODE- 250: Performed by: EMERGENCY MEDICINE

## 2018-02-08 PROCEDURE — 80053 COMPREHEN METABOLIC PANEL: CPT | Performed by: EMERGENCY MEDICINE

## 2018-02-08 PROCEDURE — 36415 COLL VENOUS BLD VENIPUNCTURE: CPT | Performed by: EMERGENCY MEDICINE

## 2018-02-08 PROCEDURE — 87086 URINE CULTURE/COLONY COUNT: CPT | Performed by: EMERGENCY MEDICINE

## 2018-02-08 PROCEDURE — 84484 ASSAY OF TROPONIN QUANT: CPT | Performed by: EMERGENCY MEDICINE

## 2018-02-08 PROCEDURE — 77030029684 HC NEB SM VOL KT MONA -A

## 2018-02-08 PROCEDURE — 85025 COMPLETE CBC W/AUTO DIFF WBC: CPT | Performed by: EMERGENCY MEDICINE

## 2018-02-08 PROCEDURE — 87186 SC STD MICRODIL/AGAR DIL: CPT | Performed by: EMERGENCY MEDICINE

## 2018-02-08 PROCEDURE — 74011250637 HC RX REV CODE- 250/637: Performed by: EMERGENCY MEDICINE

## 2018-02-08 PROCEDURE — 83880 ASSAY OF NATRIURETIC PEPTIDE: CPT | Performed by: EMERGENCY MEDICINE

## 2018-02-08 PROCEDURE — 87077 CULTURE AEROBIC IDENTIFY: CPT | Performed by: EMERGENCY MEDICINE

## 2018-02-08 PROCEDURE — 74011636320 HC RX REV CODE- 636/320: Performed by: RADIOLOGY

## 2018-02-08 PROCEDURE — 71275 CT ANGIOGRAPHY CHEST: CPT

## 2018-02-08 PROCEDURE — 93005 ELECTROCARDIOGRAM TRACING: CPT

## 2018-02-08 RX ORDER — CARVEDILOL 6.25 MG/1
3.12 TABLET ORAL
Status: COMPLETED | OUTPATIENT
Start: 2018-02-08 | End: 2018-02-08

## 2018-02-08 RX ORDER — CEFUROXIME AXETIL 500 MG/1
500 TABLET ORAL 2 TIMES DAILY
Qty: 14 TAB | Refills: 0 | Status: SHIPPED | OUTPATIENT
Start: 2018-02-08 | End: 2018-02-15

## 2018-02-08 RX ADMIN — SODIUM CHLORIDE 1000 ML: 900 INJECTION, SOLUTION INTRAVENOUS at 06:29

## 2018-02-08 RX ADMIN — ALBUTEROL SULFATE 1 DOSE: 2.5 SOLUTION RESPIRATORY (INHALATION) at 06:28

## 2018-02-08 RX ADMIN — CARVEDILOL 3.12 MG: 6.25 TABLET, FILM COATED ORAL at 06:28

## 2018-02-08 RX ADMIN — IOPAMIDOL 68 ML: 755 INJECTION, SOLUTION INTRAVENOUS at 07:25

## 2018-02-08 RX ADMIN — SODIUM CHLORIDE 1 G: 900 INJECTION, SOLUTION INTRAVENOUS at 09:27

## 2018-02-08 NOTE — ED NOTES
Pt. Says that she feels better and is without sx at the time of discharge. Pt. Is tachycardic, but she reports being tachycardic at baseline since she has has been on chemo. No PE on CT. Negative cardiac enyzmes. I will start her on ceftin. Pt. To f/u with her oncologist or return to the ER with worsening sx.

## 2018-02-08 NOTE — ED NOTES
Bedside and verbal report given to Jen Malin RN on [employer] at shift change for routine progression of care. Report consisted of patients Situation, Background, Assessment and Recommendations(SBAR). Information from the following report(s) SBAR, ED Summary, [facility] and Recent Results was reviewed with the receiving nurse. Opportunity for questions and clarification was provided.

## 2018-02-08 NOTE — ED PROVIDER NOTES
Patient is a 71 y.o. female presenting with shortness of breath and cough. Shortness of Breath   The problem has been gradually worsening. Associated symptoms include cough. Pertinent negatives include no fever, no headaches, no chest pain, no vomiting, no abdominal pain and no leg swelling. Cough   Associated symptoms include shortness of breath. Pertinent negatives include no chest pain, no chills, no headaches, no nausea and no vomiting. 71year old female with breast cancer 2011, mets to bone 2016, presents with worsening sob. Had three episodes tonight where she couldn't catch her breath. No associated CP, diaphoresis or n/v  Opened the front door and got fresh cold air which helped. 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 This is different and has never had before. On new round of chemo as of Nov., 3 weeks on, 1 week off. Couldn't get chemo last week due to low WBC>  Denies feeling fever/chills with this. Cough is productive but always is productive. Denies leg pain or swelling. Had full cardiac work up including cath in Spring- no heart issues. Pulmonologist gave her Advair inhaler but she never felt it helped so never really uses it. Had been on steroids for 4 months in the beginning- didn't do anything and gave her bad GERD. Never a smoker. No history of asthma. Doesn't eat well but drinking fluids, urine output ok. No edema. Resting tachycardia. Takes Coreg, normally in 120's. Didn't take coreg last night or this morning.      Past Medical History:   Diagnosis Date    Cancer [facility]) 2011    LEFT breast cancer    Ill-defined condition     Breast CA mets bone 2/2016       Past Surgical History:   Procedure Laterality Date    COLONOSCOPY N/A 9/15/2016    COLONOSCOPY performed by Fran Suresh MD at [address] [facility] HX APPENDECTOMY      HX BREAST LUMPECTOMY      LEFT breast    HX OTHER SURGICAL  3/2011    portacath insertion Family History:   Problem Relation Age of Onset    Cancer Mother      colon    Heart Disease Father     Cancer Father     Diabetes Sister        Social History     Social History    Marital status:      Spouse name: N/A    Number of children: N/A    Years of education: N/A     Occupational History    Not on file. Social History Main Topics    Smoking status: Never Smoker    Smokeless tobacco: Never Used    Alcohol use No    Drug use: No    Sexual activity: Yes     Partners: Male     Other Topics Concern    Not on file     Social History Narrative         ALLERGIES: Afinitor [everolimus]    Review of Systems   Constitutional: Negative for appetite change, chills and fever. HENT: Negative for congestion. Eyes: Negative for visual disturbance. Respiratory: Positive for cough and shortness of breath. Cardiovascular: Negative for chest pain and leg swelling. Gastrointestinal: Negative for abdominal pain, nausea and vomiting. Endocrine: Negative for polyuria. Genitourinary: Negative for dysuria. Musculoskeletal: Negative for gait problem. Neurological: Negative for headaches. Psychiatric/Behavioral: Negative for dysphoric mood. Vitals:    02/08/18 0605   BP: 121/68   Pulse: (!) 125   Resp: 19   Temp: 98.2 °F (36.8 °C)   SpO2: 98%   Weight: 90.7 kg (200 lb)   Height: 5' 5\" (1.651 m)            Physical Exam   Constitutional: She is oriented to person, place, and time. She appears well-developed and well-nourished. No distress. HENT:   Head: Normocephalic and atraumatic. Mouth/Throat: Oropharynx is clear and moist. No oropharyngeal exudate. Eyes: Conjunctivae and EOM are normal. Pupils are equal, round, and reactive to light. Right eye exhibits no discharge. Left eye exhibits no discharge. No scleral icterus. Neck: Normal range of motion. Neck supple. No JVD present. Cardiovascular: Regular rhythm, normal heart sounds and intact distal pulses. Tachycardia present. Exam reveals no gallop and no friction rub. No murmur heard. Pulmonary/Chest: Effort normal and breath sounds normal. No stridor. No respiratory distress. She has no wheezes. She has no rales. She exhibits no tenderness. Abdominal: Soft. Bowel sounds are normal. She exhibits no distension and no mass. There is no tenderness. There is no rebound and no guarding. Musculoskeletal: Normal range of motion. She exhibits no edema or tenderness. Neurological: She is alert and oriented to person, place, and time. She has normal reflexes. No cranial nerve deficit. She exhibits normal muscle tone. Coordination normal.   Skin: Skin is warm and dry. No rash noted. No erythema. Psychiatric: She has a normal mood and affect. Her behavior is normal. Judgment and thought content normal.        [facility]      ED Course       Procedures    ED EKG interpretation:  Rhythm: sinus tachycardia; and regular . Rate (approx.): 125; Axis: left axis deviation; P wave: normal; QRS interval: normal ; ST/T wave: non-specific changes;}. This EKG was interpreted by Flor Lala MD,ED Provider. Care signed over to Dr. Ruddy Hsu at change of shift. CT pending.

## 2018-02-08 NOTE — DISCHARGE INSTRUCTIONS
Shortness of Breath: Care Instructions  Your Care Instructions  Shortness of breath has many causes. Sometimes conditions such as anxiety can lead to shortness of breath. Some people get mild shortness of breath when they exercise. Trouble breathing also can be a symptom of a serious problem, such as asthma, lung disease, emphysema, heart problems, and pneumonia. If your shortness of breath continues, you may need tests and treatment. Watch for any changes in your breathing and other symptoms. Follow-up care is a key part of your treatment and safety. Be sure to make and go to all appointments, and call your doctor if you are having problems. It's also a good idea to know your test results and keep a list of the medicines you take. How can you care for yourself at home? · Do not smoke or allow others to smoke around you. If you need help quitting, talk to your doctor about stop-smoking programs and medicines. These can increase your chances of quitting for good. · Get plenty of rest and sleep. · Take your medicines exactly as prescribed. Call your doctor if you think you are having a problem with your medicine. · Find healthy ways to deal with stress. ¨ Exercise daily. ¨ Get plenty of sleep. ¨ Eat regularly and well. When should you call for help? Call 911 anytime you think you may need emergency care. For example, call if:  ? · You have severe shortness of breath. ? · You have symptoms of a heart attack. These may include:  ¨ Chest pain or pressure, or a strange feeling in the chest.  ¨ Sweating. ¨ Shortness of breath. ¨ Nausea or vomiting. ¨ Pain, pressure, or a strange feeling in the back, neck, jaw, or upper belly or in one or both shoulders or arms. ¨ Lightheadedness or sudden weakness. ¨ A fast or irregular heartbeat. After you call 911, the  may tell you to chew 1 adult-strength or 2 to 4 low-dose aspirin. Wait for an ambulance. Do not try to drive yourself.    ?Call your doctor now or seek immediate medical care if:  ? · Your shortness of breath gets worse or you start to wheeze. Wheezing is a high-pitched sound when you breathe. ? · You wake up at night out of breath or have to prop your head up on several pillows to breathe. ? · You are short of breath after only light activity or while at rest.   ? Watch closely for changes in your health, and be sure to contact your doctor if:  ? · You do not get better over the next 1 to 2 days. Where can you learn more? Go to http://demarco-cathy.info/. Enter S780 in the search box to learn more about \"Shortness of Breath: Care Instructions. \"  Current as of: May 12, 2017  Content Version: 11.4  © 9574-1006 [employer]. Care instructions adapted under license by [employer] (which disclaims liability or warranty for this information). If you have questions about a medical condition or this instruction, always ask your healthcare professional. [place] [address] any warranty or liability for your use of this information. Urinary Tract Infection in Women: Care Instructions  Your Care Instructions    A urinary tract infection, or UTI, is a general term for an infection anywhere between the kidneys and the urethra (where urine comes out). Most UTIs are bladder infections. They often cause pain or burning when you urinate. UTIs are caused by bacteria and can be cured with antibiotics. Be sure to complete your treatment so that the infection goes away. Follow-up care is a key part of your treatment and safety. Be sure to make and go to all appointments, and call your doctor if you are having problems. It's also a good idea to know your test results and keep a list of the medicines you take. How can you care for yourself at home? · Take your antibiotics as directed. Do not stop taking them just because you feel better. You need to take the full course of antibiotics.   · Drink extra water and other fluids for the next day or two. This may help wash out the bacteria that are causing the infection. (If you have kidney, heart, or liver disease and have to limit fluids, talk with your doctor before you increase your fluid intake.)  · Avoid drinks that are carbonated or have caffeine. They can irritate the bladder. · Urinate often. Try to empty your bladder each time. · To relieve pain, take a hot bath or lay a heating pad set on low over your lower belly or genital area. Never go to sleep with a heating pad in place. To prevent UTIs  · Drink plenty of water each day. This helps you urinate often, which clears bacteria from your system. (If you have kidney, heart, or liver disease and have to limit fluids, talk with your doctor before you increase your fluid intake.)  · Urinate when you need to. · Urinate right after you have sex. · Change sanitary pads often. · Avoid douches, bubble baths, feminine hygiene sprays, and other feminine hygiene products that have deodorants. · After going to the bathroom, wipe from front to back. When should you call for help? Call your doctor now or seek immediate medical care if:  ? · Symptoms such as fever, chills, nausea, or vomiting get worse or appear for the first time. ? · You have new pain in your back just below your rib cage. This is called flank pain. ? · There is new blood or pus in your urine. ? · You have any problems with your antibiotic medicine. ? Watch closely for changes in your health, and be sure to contact your doctor if:  ? · You are not getting better after taking an antibiotic for 2 days. ? · Your symptoms go away but then come back. Where can you learn more? Go to http://demarco-cathy.info/. Enter Z812 in the search box to learn more about \"Urinary Tract Infection in Women: Care Instructions. \"  Current as of: May 12, 2017  Content Version: 11.4  © 4813-6330 [employer], [facility].  Care instructions adapted under license by [employer] (which disclaims liability or warranty for this information). If you have questions about a medical condition or this instruction, always ask your healthcare professional. [address] any warranty or liability for your use of this information.

## 2018-02-08 NOTE — ED NOTES
Assumed care of pt from EMS. Pt presents to ED with chief complaint of Shortness of breath along with productive cough. Pt reports ever she was started on a chemo medication about 1.5 years ago, pt has had shortness of breath upon exertion and a productive cough with clear mucous. PT reports ever since she has been getting chemo, her heart rate maintains in the 120's. Pt reports she takes coreg for her high heart rate. PT denies any pain at this time. Pt is A&O x 4. Pt denies any other symptoms at this time. Pt resting comfortably on the stretcher in a position of comfort. Pt in no acute distress at this time. Call bell within reach. Side rails x 2. Cardiac monitor x 3. Stretcher locked in the lowest position. Pt aware of plan to await for MD/PA-C/NP assessment, and pt/family verbalizes understanding. Will continue to monitor.

## 2018-02-09 NOTE — PROGRESS NOTES
NNTOCED     Seen at [facility] ED on 2/7/2018     Chief Complaint- SOB     ED Evaluation- Pt. Admitted from EMS for SOB. Pt. Reports hx of Chemo drug for 1.5 yrs. Pt reports HR in 120's since taking Chemo drug. Taking Coreg for control of HR. Did not take last dose. EKG in ED shows sinus tach. UA shows positive UTI, urine culture pending. Explained to patient that the Urine Culture will be reviewed and may expect a possible change in antibiotic upon result. Liver Enzymes elevated and per EMR review shows that this is a baseline finding. WBC 4.1 pt reports last round of chemo due to low WBC. Pt treated in ED with 1000 m. NS, one dose IVPB Rocephin, and RX for po antibiotic.         Follow up-Contacted pt. For follow up states feeling ok at present time. States has decreased appitite due to Chemo. States understanding of need to eat small frequent meals and to increase protein. Pt states understanding of need to complete antibiotic, need to repeat UA at follow up with Oncologist at scheduled appt. as pt. Declined the need to see PCP for this. Pt understands need to follow up with Oncologist if she has a fever or further symptoms of UTI. Pt had no other questions or concerns with NN.     Plan- Pt will follow up with Oncology MD at appt. 2/15/2018.        Goals      Pt will complete current course of antibiotic - Ceftin            2/8/2018 Pt reported filling and understanding need to take and complete course of antibiotics. PAC       Pt. will follow up with Oncology to follow up UA            2/8/2018  Pt states she has a follow up appt. with Oncology on Thursday 2/15/2018 and will follow up with regarding UA. - PAC        This note will not be viewable in [address]

## 2018-02-10 LAB
BACTERIA SPEC CULT: ABNORMAL
CC UR VC: ABNORMAL
SERVICE CMNT-IMP: ABNORMAL

## 2018-02-12 ENCOUNTER — HOSPITAL ENCOUNTER (OUTPATIENT)
Dept: CT IMAGING | Age: 73
Discharge: HOME | End: 2018-02-12
Attending: INTERNAL MEDICINE
Payer: MEDICARE

## 2018-02-12 DIAGNOSIS — C50.412 MALIGNANT NEOPLASM OF UPPER-OUTER QUADRANT OF LEFT FEMALE BREAST (HCC): ICD-10-CM

## 2018-02-12 PROCEDURE — 74011636320 HC RX REV CODE- 636/320: Performed by: INTERNAL MEDICINE

## 2018-02-12 PROCEDURE — 74177 CT ABD & PELVIS W/CONTRAST: CPT

## 2018-02-12 RX ADMIN — IOPAMIDOL 100 ML: 755 INJECTION, SOLUTION INTRAVENOUS at 09:33

## 2018-04-18 ENCOUNTER — HOSPITAL ENCOUNTER (OUTPATIENT)
Dept: CT IMAGING | Age: 73
Discharge: HOME | End: 2018-04-18
Attending: INTERNAL MEDICINE
Payer: MEDICARE

## 2018-04-18 DIAGNOSIS — C50.412 MALIGNANT NEOPLASM OF UPPER-OUTER QUADRANT OF LEFT FEMALE BREAST (HCC): ICD-10-CM

## 2018-04-18 PROCEDURE — 74011636320 HC RX REV CODE- 636/320: Performed by: INTERNAL MEDICINE

## 2018-04-18 PROCEDURE — 74177 CT ABD & PELVIS W/CONTRAST: CPT

## 2018-04-18 RX ADMIN — IOPAMIDOL 100 ML: 755 INJECTION, SOLUTION INTRAVENOUS at 09:52

## 2018-05-31 RX ORDER — ATORVASTATIN CALCIUM 10 MG/1
TABLET, FILM COATED ORAL
Qty: 30 TAB | Refills: 3 | Status: SHIPPED | OUTPATIENT
Start: 2018-05-31 | End: 2018-10-19 | Stop reason: SDUPTHER

## 2018-05-31 NOTE — TELEPHONE ENCOUNTER
Pharmacy verified. Requested Prescriptions     Pending Prescriptions Disp Refills    atorvastatin (LIPITOR) 10 mg tablet 30 Tab 3     Sig: TAKE 1 TAB BY MOUTH DAILY.      Thanks

## 2018-06-29 RX ORDER — CARVEDILOL 3.12 MG/1
TABLET ORAL
Qty: 60 TAB | Refills: 2 | Status: SHIPPED | OUTPATIENT
Start: 2018-06-29

## 2018-07-03 ENCOUNTER — OFFICE VISIT (OUTPATIENT)
Dept: CARDIOLOGY CLINIC | Age: 73
End: 2018-07-03

## 2018-07-03 VITALS
BODY MASS INDEX: 28.96 KG/M2 | RESPIRATION RATE: 18 BRPM | WEIGHT: 173.8 LBS | HEIGHT: 65 IN | SYSTOLIC BLOOD PRESSURE: 120 MMHG | OXYGEN SATURATION: 96 % | HEART RATE: 98 BPM | DIASTOLIC BLOOD PRESSURE: 78 MMHG

## 2018-07-03 DIAGNOSIS — R05.9 COUGH: ICD-10-CM

## 2018-07-03 DIAGNOSIS — R06.00 DYSPNEA, UNSPECIFIED TYPE: Primary | ICD-10-CM

## 2018-07-03 RX ORDER — LEVOCETIRIZINE DIHYDROCHLORIDE 5 MG/1
5 TABLET, FILM COATED ORAL DAILY
COMMUNITY
Start: 2018-07-02

## 2018-07-03 NOTE — PROGRESS NOTES
Mercedes Hirsch MD    [id number] [facility] [place], [address] [facility]    Office [phone],JZU [phone]  Pager [phone]    Allyson Caldwell is a 71 y.o. female is here for f/u visit. Primary care physician:  Shreya Montes MD    Patient Active Problem List   Diagnosis Code    Breast cancer, stage 3 ([address] C50.919    Pneumonia J18.9    SIRS (systemic inflammatory response syndrome) (HCC) R65.10    Transaminitis R74.0    Lactic acidosis E87.2       Dear Dr. Alber Croft,    I had the pleasure of seeing Ms. Allyson Caldwell in the office today. Chief complaint:  Chief Complaint   Patient presents with    Follow-up     SIRS,6 mths       Assessment:  Chronic cough/ mild chronic dyspnea on exertion-patient has been seen by ENT/pulmonary. Attributed to either asthma/GERD. \  Left breast cancer metastasized to bone-patient recently had restarted chemotherapy. She was on Taxol. However she had a reaction to the Taxol and is now taking a different chemotherapy medication. (History of radiation treatment to the chest)  GERD  Asthma      Plan:   Normal coronaries by cath 3/60/17. Echo 6/19/17-normal EF. Will repeat echocardiogram for EF since she has restarted chemotherapy  Clinically patient is euvolemic. Continue Coreg. She is followed by pulmonology. She had pneumonitis from her previous chemotherapy medication. She has also been told that she has asthma and uses inhalers. Aggressive cardiovascular risk factor modification. Follow-up in12 months/prn. Patient understands the plan. All questions were answered to the patient's satisfaction. Medication Side Effects and Warnings were discussed with patient: yes  Patient Labs were reviewed and or requested:  yes  Patient Past Records were reviewed and or requested: yes    I appreciate the opportunity to be involved in Ms. Love. See note below for details.  Please do not hesitate to contact us with questions or concerns. Cassidy Edwards MD    Cardiac Testing/ Procedures: A. Cardiac Cath/PCI: 3/16/17 Tortuous Coronaries    L Main: Nml    LAD: Med to Large; MLI; D1 - MLI    LCflex: Large; MLI; OM1 - MLI    RCA: Dominant Med to large; MLI    LVEDP: WNL    LVEF: 45%/ Apical ballooning - c/w Takotsubo's CMP    No significant gradient across aortic valve. Specimens Removed : None    B.ECHO/ZAID:  ECHO 6/19/17 EF 55%    2/6/11 Left ventricle: Systolic function was normal. Ejection fraction was  estimated in the range of 55 % to 60 %. There were no regional wall motion  abnormalities. 3/30/11 - Left ventricle: Systolic function was normal. Ejection fraction was  estimated in the range of 55 % to 60 %. There were no regional wall motion  abnormalities. C.StressNuclear/Stress ECHO/Stress test:    D.Vascular:    E. EP:    F. Miscellaneous:    Subjective:  Aroldo Quiroz is a 71 y.o. female who returns for follow up visit. Patient continues to have mild dyspnea with exertion/chronic cough. No chest pain. No dizziness. Followed by pulmonary. Has also seen ENT. History of left breast cancer metastasized to bone. On chemotherapy. She states that her \"tumors are shrinking\". (1/31/17  Initial visit- Patient is a pleasant 61-year-old  female who has a history of breast cancer status post left lumpectomy with radiation treatment to the chest twice. She has been on chemotherapy. She states that her breast cancer has metastasized to the bone-spine/hips/sternum/ribs. She was on a medication for her cancer recently which caused her to have pneumonitis. She is off that medication. She has seen  (pulmonologist) who has treated the pneumonitis with steroids. She developed GERD because of the steroids and is now on a PPI. On 1/23/17 she came to the emergency room because she had an episode of diaphoresis, epigastric pain, dizziness when she stood up. CT scan was negative for PE.  Troponin was negative. EKG showed sinus tachycardia.)      ROS:  (bold if positive, if negative)             Medications before admission:    Current Outpatient Prescriptions   Medication Sig Dispense    levocetirizine (XYZAL) 5 mg tablet Take 5 mg by mouth daily.  carvedilol (COREG) 3.125 mg tablet TAKE ONE TABLET BY MOUTH TWICE A DAY WITH MEALS  60 Tab    atorvastatin (LIPITOR) 10 mg tablet TAKE 1 TAB BY MOUTH DAILY. 30 Tab    loratadine (CLARITIN) 10 mg tablet Take 10 mg by mouth daily.  montelukast (SINGULAIR) 10 mg tablet Take 10 mg by mouth daily.  omeprazole (PRILOSEC) 40 mg capsule Take  by mouth daily.  denosumab (XGEVA) soln injection 120 mg by SubCUTAneous route every thirty (30) days.  pyridoxine (VITAMIN B-6) 100 mg tablet Take 100 mg by mouth daily.  calcium-vitamin D (OYSTER SHELL) 500 mg(1,250mg) -200 unit per tablet Take 1 Tab by mouth daily. No current facility-administered medications for this visit. Family History of CAD:    Yes    Social History:  Current  Smoker  No    Physical Exam:       Visit Vitals    /78 (BP 1 Location: Right arm, BP Patient Position: Sitting)    Pulse 98    Resp 18    Ht 5' 5\" (1.651 m)    Wt 173 lb 12.8 oz (78.8 kg)    LMP 01/28/2000    SpO2 96%    BMI 28.92 kg/m2         Gen: Well-developed, well-nourished, in no acute distress  Neck: Supple,No JVD, No Carotid Bruit,   Resp: No accessory muscle use, Clear breath sounds, No rales or rhonchi  Card: Regular Rate,Rythm,Normal S1, S2, No murmurs, rubs or gallop. No thrills.    Abd:  Soft, non-tender, non-distended,BS+,   MSK: No cyanosis  Skin: No rashes    Neuro: moving all four extremities , follows commands appropriately  Psych:  Good insight, oriented to person, place , alert, Nml Affect  LE: No edema    EKG:       LABS:        Lab Results   Component Value Date/Time    WBC 4.1 02/08/2018 06:12 AM    HGB 10.3 (L) 02/08/2018 06:12 AM    HCT 32.5 (L) 02/08/2018 06:12 AM PLATELET 880 47/45/0793 06:12 AM     Lab Results   Component Value Date/Time    Sodium 139 02/08/2018 06:12 AM    Potassium 3.8 02/08/2018 06:12 AM    Chloride 104 02/08/2018 06:12 AM    CO2 28 02/08/2018 06:12 AM    Anion gap 7 02/08/2018 06:12 AM    Glucose 101 (H) 02/08/2018 06:12 AM    BUN 8 02/08/2018 06:12 AM    Creatinine 0.67 02/08/2018 06:12 AM    BUN/Creatinine ratio 12 02/08/2018 06:12 AM    GFR est AA >60 02/08/2018 06:12 AM    GFR est non-AA >60 02/08/2018 06:12 AM    Calcium 8.7 02/08/2018 06:12 AM       Lab Results   Component Value Date/Time    aPTT 30.6 10/04/2016 03:21 PM     Lab Results   Component Value Date/Time    INR 1.0 01/23/2017 02:54 PM    INR 1.1 10/04/2016 03:21 PM    INR 0.9 02/11/2016 09:00 AM    Prothrombin time 10.0 01/23/2017 02:54 PM    Prothrombin time 10.5 10/04/2016 03:21 PM    Prothrombin time 9.5 02/11/2016 09:00 AM     No components found for: Clara Avalos MD

## 2018-07-03 NOTE — MR AVS SNAPSHOT
[address]  [place] [address] [facility] 
[phone] Patient: Josh Sheets MRN: [id number] :1945 Visit Information Date & Time Provider Department Dept. Phone Encounter #  
 7/3/2018  1:20 PM Nick Liu MD [facility] Tamia Morgan [phone] [id number] Your Appointments 2018  1:00 PM  
ECHO CARDIOGRAMS 2D with ECHO, [facility]  
[facility] ([facility] SCHEDULING) Appt Note: echo per dr hatch  
 [address] [facility] [address]  
[phone]  
  
   
 [address] [facility] [address]  
  
    
 2019  1:40 PM  
ESTABLISHED PATIENT with Nick Liu MD  
[facility] ([facility]) Appt Note: annual  
 [address] [facility] [address] [facility]  
[address] [place] [address] [address] Upcoming Health Maintenance Date Due DTaP/Tdap/Td series (1 - Tdap) 1966 ZOSTER VACCINE AGE 60> 10/16/2006 GLAUCOMA SCREENING Q2Y 2016 BREAST CANCER SCRN MAMMOGRAM 2018 MEDICARE YEARLY EXAM 3/14/2018 Influenza Age 5 to Adult 2018 COLONOSCOPY 9/15/2026 Allergies as of 7/3/2018  Review Complete On: 7/3/2018 By: Karol Suarez LPN Severity Noted Reaction Type Reactions Afinitor [Everolimus]  2017    Other (comments) \"inflammation in my lungs\" Current Immunizations  Reviewed on 7/3/2018 Name Date Influenza High Dose Vaccine PF 10/24/2016 Influenza Vaccine 2014 Influenza Vaccine Split 10/13/2011 Pneumococcal Conjugate (PCV-13) 10/6/2015 Pneumococcal Vaccine (Unspecified Type) 10/1/2013 Reviewed by Karol Suarez LPN on  at  8:53 PM  
Vitals  BP Pulse Resp Height(growth percentile) Weight(growth percentile) LMP  
 120/78 (BP 1 Location: Right arm, BP Patient Position: Sitting) 98 18 5' 5\" (1.651 m) 173 lb 12.8 oz (78.8 kg) 01/28/2000 SpO2 BMI OB Status Smoking Status 96% 28.92 kg/m2 Postmenopausal Never Smoker Vitals History BMI and BSA Data Body Mass Index Body Surface Area  
 28.92 kg/m 2 1.9 m 2 Preferred Pharmacy Pharmacy Name Phone [id number] [place] [address] [phone] Your Updated Medication List  
  
   
This list is accurate as of 7/3/18  1:51 PM.  Always use your most recent med list.  
  
  
  
  
 atorvastatin 10 mg tablet Commonly known as:  LIPITOR  
TAKE 1 TAB BY MOUTH DAILY. calcium-vitamin D 500 mg(1,250mg) -200 unit per tablet Commonly known as:  OYSTER SHELL Take 1 Tab by mouth daily. carvedilol 3.125 mg tablet Commonly known as:  COREG  
TAKE ONE TABLET BY MOUTH TWICE A DAY WITH MEALS  
  
 CLARITIN 10 mg tablet Generic drug:  loratadine Take 10 mg by mouth daily. denosumab Soln injection Commonly known as:  XGEVA  
120 mg by SubCUTAneous route every thirty (30) days. levocetirizine 5 mg tablet Commonly known as:  Soumya Ala Take 5 mg by mouth daily. omeprazole 40 mg capsule Commonly known as:  PRILOSEC Take  by mouth daily. SINGULAIR 10 mg tablet Generic drug:  montelukast  
Take 10 mg by mouth daily. VITAMIN B-6 100 mg tablet Generic drug:  pyridoxine (vitamin B6) Take 100 mg by mouth daily. Please provide this summary of care documentation to your next provider. Your primary care clinician is listed as Nadira Resendiz. If you have any questions after today's visit, please call [phone].

## 2018-07-03 NOTE — PROGRESS NOTES
Chief Complaint   Patient presents with    Follow-up     SIRS,6 mths     1. Have you been to the ER, urgent care clinic since your last visit? Hospitalized since your last visit? No    2. Have you seen or consulted any other health care providers outside of the [facility] since your last visit? Include any pap smears or colon screening.  No    Visit Vitals    /78 (BP 1 Location: Right arm, BP Patient Position: Sitting)    Pulse 98    Resp 18    Ht 5' 5\" (1.651 m)    Wt 173 lb 12.8 oz (78.8 kg)    LMP 01/28/2000    SpO2 96%    BMI 28.92 kg/m2

## 2018-07-11 ENCOUNTER — CLINICAL SUPPORT (OUTPATIENT)
Dept: CARDIOLOGY CLINIC | Age: 73
End: 2018-07-11

## 2018-07-11 DIAGNOSIS — R06.00 DYSPNEA, UNSPECIFIED TYPE: Primary | ICD-10-CM

## 2018-10-16 RX ORDER — ATORVASTATIN CALCIUM 10 MG/1
TABLET, FILM COATED ORAL
Qty: 30 TAB | Refills: 2 | OUTPATIENT
Start: 2018-10-16

## 2018-10-19 RX ORDER — ATORVASTATIN CALCIUM 10 MG/1
TABLET, FILM COATED ORAL
Qty: 30 TAB | Refills: 3 | Status: SHIPPED | OUTPATIENT
Start: 2018-10-19

## 2019-01-11 ENCOUNTER — OFFICE VISIT (OUTPATIENT)
Dept: INTERNAL MEDICINE CLINIC | Age: 74
End: 2019-01-11

## 2019-01-11 VITALS
SYSTOLIC BLOOD PRESSURE: 105 MMHG | BODY MASS INDEX: 28.41 KG/M2 | DIASTOLIC BLOOD PRESSURE: 71 MMHG | WEIGHT: 170.5 LBS | HEART RATE: 89 BPM | OXYGEN SATURATION: 99 % | RESPIRATION RATE: 18 BRPM | HEIGHT: 65 IN | TEMPERATURE: 98 F

## 2019-01-11 DIAGNOSIS — Z01.818 PREOP GENERAL PHYSICAL EXAM: ICD-10-CM

## 2019-01-11 DIAGNOSIS — R65.10 SIRS (SYSTEMIC INFLAMMATORY RESPONSE SYNDROME) (HCC): ICD-10-CM

## 2019-01-11 DIAGNOSIS — H25.9 AGE-RELATED CATARACT OF BOTH EYES, UNSPECIFIED AGE-RELATED CATARACT TYPE: Primary | ICD-10-CM

## 2019-01-11 DIAGNOSIS — C50.919 MALIGNANT NEOPLASM OF BREAST, STAGE 3, UNSPECIFIED LATERALITY (HCC): ICD-10-CM

## 2019-01-11 RX ORDER — BUDESONIDE AND FORMOTEROL FUMARATE DIHYDRATE 160; 4.5 UG/1; UG/1
2 AEROSOL RESPIRATORY (INHALATION) 2 TIMES DAILY
COMMUNITY

## 2019-01-11 NOTE — PATIENT INSTRUCTIONS
Cataract Surgery: Before Your Surgery  What is cataract surgery? Cataracts are cloudy areas in the lens of your eye. Your lens is behind the colored part of your eye (iris). Its job is to focus light onto the back of your eye. In some people, cataracts prevent light from reaching the back of the eye. This can cause vision problems. Cataract surgery helps you see better. It replaces your natural lens, which has become cloudy, with a clear artificial one. There are two types of cataract surgery. Phacoemulsification (say \"eccl-kd-jb-vkg-tlv-ums-GABINO-shun\") is the most common type. The doctor makes a small cut in your eye. This cut is called an incision. The doctor uses a special ultrasound tool to break your cloudy lens apart. Sometimes a laser is used too. Then he or she removes the small pieces of the lens through the incision. In most cases, the doctor then inserts an artificial lens through the incision. Most people do not need stitches, because the incision is so small. If the doctor is not able to put in an artificial lens, you can wear a contact lens or thick glasses in place of your natural lens. Extracapsular extraction is a less common type of cataract surgery. The doctor makes a larger incision to remove the whole lens at once. After the doctor removes the lens, he or she stitches up the incision. Recovery from this type of surgery takes longer. Before either surgery, the doctor puts numbing drops in your eye. Some doctors use a shot instead. You may also get medicine to make you feel relaxed. You probably will not feel much pain. The surgery takes about 20 to 40 minutes. After surgery, you may have a bandage or shield on your eye. You will probably go home from surgery after 1 hour in the recovery room. Most people see better in 1 to 3 days. You may be able to go back to work or your normal routine in a few days.  It could take 3 to 10 weeks for your eye to completely heal. After your eye heals, you may still need to wear glasses, especially for reading. Follow-up care is a key part of your treatment and safety. Be sure to make and go to all appointments, and call your doctor if you are having problems. It's also a good idea to know your test results and keep a list of the medicines you take. What happens before surgery?   Surgery can be stressful. This information will help you understand what you can expect. And it will help you safely prepare for surgery.   Preparing for surgery    · Understand exactly what surgery is planned, along with the risks, benefits, and other options. · Tell your doctors ALL the medicines, vitamins, supplements, and herbal remedies you take. Some of these can increase the risk of bleeding or interact with anesthesia.     · If you take blood thinners, such as warfarin (Coumadin), clopidogrel (Plavix), or aspirin, be sure to talk to your doctor. He or she will tell you if you should stop taking these medicines before your surgery. Make sure that you understand exactly what your doctor wants you to do.     · Your doctor will tell you which medicines to take or stop before your surgery. You may need to stop taking certain medicines a week or more before surgery. So talk to your doctor as soon as you can.     · If you have an advance directive, let your doctor know. It may include a living will and a durable power of  for health care. Bring a copy to the hospital. If you don't have one, you may want to prepare one. It lets your doctor and loved ones know your health care wishes. Doctors advise that everyone prepare these papers before any type of surgery or procedure. What happens on the day of surgery? · Follow the instructions exactly about when to stop eating and drinking. If you don't, your surgery may be canceled.  If your doctor told you to take your medicines on the day of surgery, take them with only a sip of water.     · Take a bath or shower before you come in for your surgery. Do not apply lotions, perfumes, deodorants, or nail polish.     · Take off all jewelry and piercings. And take out contact lenses, if you wear them.    At the hospital or surgery center   · Bring a picture ID.     · The area for surgery is often marked to make sure there are no errors.     · You will be kept comfortable and safe by your anesthesia provider. The anesthesia may make you sleep. Or it may just numb the area being worked on.     · The surgery will take about 20 to 40 minutes. Going home   · Be sure you have someone to drive you home. Anesthesia and pain medicine make it unsafe for you to drive.     · You will be given more specific instructions about recovering from your surgery. They will cover things like diet, wound care, follow-up care, driving, and getting back to your normal routine.     · You may have a bandage or patch over your eye. You may also have a clear shield over your eye. This prevents you from rubbing it. When should you call your doctor? · You have questions or concerns.     · You don't understand how to prepare for your surgery.     · You become ill before the surgery (such as fever, flu, or a cold).     · You need to reschedule or have changed your mind about having the surgery. Where can you learn more? Go to http://demarco-cathy.info/. Enter K474 in the search box to learn more about \"Cataract Surgery: Before Your Surgery. \"  Current as of: December 3, 2017  Content Version: 11.8  © 6663-1687 [employer]. Care instructions adapted under license by [employer] (which disclaims liability or warranty for this information). If you have questions about a medical condition or this instruction, always ask your healthcare professional. [place] [address] any warranty or liability for your use of this information.

## 2019-01-11 NOTE — PROGRESS NOTES
HISTORY OF PRESENT ILLNESS  Maurizio Guzmán is a 68 y.o. female. HPI  Maurizio Guzmán was referred for evaluation by:Dr. Tong Gimenez for Pre- Op Evaluation. Please see encounter details and orders for consultative summary. Type of surgery : Cataract extraction with placement of intraocular lens  Surgery site : Right eye 1/17/2019; left eye 1/31/2019  Anesthesia type: [facility] [address]  Date of procedure: As noted above    Allergies: Allergies   Allergen Reactions    Afinitor [Everolimus] Other (comments)     \"inflammation in my lungs\"    Taxol [Paclitaxel] Other (comments)     Blood pressure problems     Latex allergy: no  Prior reactions to anesthesia:  None    Functional status:  she is unable to ambulate up 1 flights of step without shortness of breath, chest pain; has history of inflammatory lung disease. Followed by pulmonary and cardiology. Prior cardiac evaluation:   Sees Dr Augustin Lees on a regular basis and has been stable. Sees her oncologist, Dr. Kirill Larsen on a regular basis. Current Outpatient Medications   Medication Sig    budesonide-formoterol (SYMBICORT) 160-4.5 mcg/actuation HFAA Take 2 Puffs by inhalation two (2) times a day.  ALBUTEROL IN Take  by inhalation.  abemaciclib (VERZENIO PO) Take  by mouth two (2) times a day.  atorvastatin (LIPITOR) 10 mg tablet TAKE 1 TAB BY MOUTH DAILY.  levocetirizine (XYZAL) 5 mg tablet Take 5 mg by mouth daily.  carvedilol (COREG) 3.125 mg tablet TAKE ONE TABLET BY MOUTH TWICE A DAY WITH MEALS     montelukast (SINGULAIR) 10 mg tablet Take 10 mg by mouth daily.  denosumab (XGEVA) soln injection 120 mg by SubCUTAneous route every thirty (90) minutes as needed+R. No current facility-administered medications for this visit.       Past Medical History:   Diagnosis Date    Cancer [facility]) 2011    LEFT breast cancer    Hypercholesterolemia     Ill-defined condition     Breast CA mets bone 2/2016     Past Surgical History: Procedure Laterality Date    COLONOSCOPY N/A 9/15/2016    COLONOSCOPY performed by Jennifer Dwyer MD at [address] [facility] HX APPENDECTOMY      HX BREAST LUMPECTOMY      LEFT breast    HX HEENT      wisdom teeth extraction    HX OTHER SURGICAL  3/2011    portacath insertion    VASCULAR SURGERY PROCEDURE UNLIST      port a cath insertion -- 2017     Social History     Tobacco Use    Smoking status: Never Smoker    Smokeless tobacco: Never Used   Substance Use Topics    Alcohol use: No    Drug use: No       Blood pressure 105/71, pulse 89, temperature 98 °F (36.7 °C), temperature source Oral, resp. rate 18, height 5' 5\" (1.651 m), weight 170 lb 8 oz (77.3 kg), last menstrual period 01/28/2000, SpO2 99 %. Review of Systems   Constitutional: Positive for malaise/fatigue. Negative for chills and fever. HENT: Negative for congestion and sore throat. Eyes: Positive for blurred vision. Respiratory: Positive for shortness of breath (chronic). Negative for cough and wheezing. Cardiovascular: Negative for chest pain and palpitations. Gastrointestinal: Negative for abdominal pain, blood in stool, constipation, diarrhea, nausea and vomiting. Genitourinary: Negative for dysuria, frequency and hematuria. Musculoskeletal: Negative for myalgias. Skin: Negative for rash. Neurological: Negative for dizziness, tingling and headaches. Physical Exam   Constitutional: She is oriented to person, place, and time. She appears well-developed and well-nourished. No distress. HENT:   Head: Normocephalic and atraumatic. Right Ear: External ear normal.   Left Ear: External ear normal.   Nose: Nose normal.   Mouth/Throat: Oropharynx is clear and moist.   Eyes: Conjunctivae are normal.   Neck: Normal range of motion. Neck supple. No thyromegaly present. Cardiovascular: Normal rate, regular rhythm and normal heart sounds. No murmur heard.   Pulmonary/Chest: Effort normal and breath sounds normal. She has no wheezes. She has no rales. Abdominal: Soft. Bowel sounds are normal. There is no tenderness. There is no rebound. Musculoskeletal: Normal range of motion. She exhibits no edema. Lymphadenopathy:     She has no cervical adenopathy. Neurological: She is alert and oriented to person, place, and time. No cranial nerve deficit. Skin: Skin is warm and dry. Psychiatric: She has a normal mood and affect. Her behavior is normal.   Nursing note and vitals reviewed. ASSESSMENT and PLAN  Diagnoses and all orders for this visit:    1. Age-related cataract of both eyes, unspecified age-related cataract type    2. Preop general physical exam --considered medically stable for upcoming low risk cataract extraction surgery. 3. Malignant neoplasm of breast, stage 3, unspecified laterality ([facility] [address] --followed by oncology    4. SIRS (systemic inflammatory response syndrome) (HCC) --followed by pulmonary, cardiology.       lab results and schedule of future lab studies reviewed with patient  reviewed diet, exercise and weight control  reviewed medications and side effects in detail

## 2022-06-30 NOTE — PROGRESS NOTES
HISTORY OF PRESENT ILLNESS  Josh Sheets is a 80 y.o. female. HPI     Pt was seen in the ED on 1/23/17 with complaints of chest pain. She reported crampy mid-sternal chest pain that was accompanied by diaphoresis and lightheadedness. She took new oral chemotherapy medication about 2 hours prior to symptom onset (Capecitabine). CTA of the chest was performed in the ED and ruled out pulmonary embolism, aortic aneurysm, or dissection. Chest x-ray ruled out any acute process in the lungs. Cardiac enzymes and EKG were normal in the ED. It was suggested she see cardiologist. Pt was told that she was dehydrated and given fluids in the ED which helped her heart rate go down. Her heart rate is elevated today (137). Reports chest pain occasionally radiates to her back. Breast Cancer- pt was previously taking Aromasin and Afinitor although she stopped Afinitor due to pneumonitis. Dr. Alida Holguin told her that estrogen inhibitors were not working and pt was started on Capecitabine 4 tablets BID. Pt started taking Capecitabine two days ago. She denies bony pain. Pt states that she is fatigued although is able to get around. GERD- pt states that she has acid reflux which she attributes to the Prednisone Dr. Lon Wellington prescribed her for pneumonitis. She is weaning off of Prednisone. Pt states she still has shortness of breath with walking long distances although thinks it has improved some. She states that she saw pulmonologist Dr. Lon Wellington for pneumonitis due to Afinitor (sent by Dr. Alida Holguin). Dr. Lon Wellington told her that her lungs are healing and she has some scar tissue. He prescribed pt Prednisone and she is still taking this although working on tapering off of this. She was told this could cause reflux and she should take either Pepcid or Omeprazole and wonders which one she should take. Pt states that Dr. Lon Wellington gave her albuterol nebulizer and Advair although she has not started using Advair nebulizer yet.  She also notes that Dr. Ivone Pastor recommended she see cardiologist. Pt states that Dr. Lamar Brooke told her to take Mucinex for her cough to mobilize sputum although pt was then told to take cough syrup to suppress her cough- she wonders what she should do. She does not cough at night. Pt also has some nasal congestion and drainage and was told to take Zyrtec. Review of Systems   Respiratory: Positive for cough and shortness of breath. Cardiovascular: Positive for chest pain. All other systems reviewed and are negative. Physical Exam   Constitutional: She is oriented to person, place, and time. She appears well-developed and well-nourished. HENT:   Head: Normocephalic and atraumatic. Right Ear: External ear normal.   Left Ear: External ear normal.   Nose: Nose normal.   Mouth/Throat: Oropharynx is clear and moist.   Eyes: Conjunctivae and EOM are normal.   Neck: Normal range of motion. Neck supple. Carotid bruit is not present. No thyroid mass and no thyromegaly present. Cardiovascular: Normal rate, regular rhythm, S1 normal, S2 normal, normal heart sounds and intact distal pulses. Pulmonary/Chest: Effort normal and breath sounds normal.   Abdominal: Soft. Normal appearance and bowel sounds are normal. There is no hepatosplenomegaly. There is no tenderness. Musculoskeletal: Normal range of motion. Neurological: She is alert and oriented to person, place, and time. She has normal strength. No cranial nerve deficit or sensory deficit. Coordination normal.   Skin: Skin is warm, dry and intact. No abrasion and no rash noted. Psychiatric: She has a normal mood and affect. Her behavior is normal. Judgment and thought content normal.   Nursing note and vitals reviewed. ASSESSMENT and PLAN  Jaden Hernandez was seen today for hospital follow up.     Diagnoses and all orders for this visit:    Other chest pain  I told pt that chest pain does not sound like it was in her lungs as these have been stable for a few months with no acute wheezing. Pt should have an echo performed to look at her heart function to make sure the new chemo medications she is taking are not affecting her heart. Her pulse was 140 today and she has some new chest pain and has been short of breath for awhile. Need to see cardiologist to determine if shortness of breath is due to possible heart involvement from the new chemo drugs she is taking. I will get her in with cardiologist- can send [employer] message about appointment date and time. Breast cancer, stage 3, unspecified laterality ([facility] [address]  Pt now taking Capecitabine 4 tablets BID. Pt is followed by Dr. Brooke Norris. Gastroesophageal reflux disease without esophagitis  I told pt that she should take Omeprazole for reflux. SOB (shortness of breath)  She had an inflammatory reaction in her lungs from Afinitor and has seen pulmonologist who prescribed her Prednisone for which she is tapering this dose. Dr. Eliecer Vargas suggested she use Advair and I told her this was appropriate and should help with her shortness of breath. Advised pt that she can take Mucinex throughout the day to mobilize sputum and if her throat is itchy she can take Robitussin to numb the back of her throat. Pt should take Zyrtec for a few days to see if this helps her drainage.  -     REFERRAL TO CARDIOLOGY    reviewed medications and side effects in detail     Written by Terrance Rios, as dictated by Onel Roche MD.     Current diagnosis and concerns discussed with pt at length. Understands risks and benefits or current treatment plan and medications and accepts the treatment and medication with any possible risks. Pt asks appropriate questions which were answered. Pt instructed to call with any concerns or problems. [Alert] : alert [Normocephalic] : normocephalic [Flat Open Anterior Garber] : flat open anterior fontanelle [Red Reflex Bilateral] : red reflex bilateral [Normally Placed Ears] : normally placed ears [No Discharge] : no discharge [Palate Intact] : palate intact [Clear to Auscultation Bilaterally] : clear to auscultation bilaterally [Regular Rate and Rhythm] : regular rate and rhythm [No Murmurs] : no murmurs [Soft] : soft [Normoactive Bowel Sounds] : normoactive bowel sounds [Curt 1] : Curt 1 [No Rash or Lesions] : no rash or lesions [FreeTextEntry1] : BIG FOR AGE [de-identified] : 2 TEETH  [de-identified] : FULL ROM yes